# Patient Record
Sex: MALE | Race: WHITE | NOT HISPANIC OR LATINO | Employment: FULL TIME | URBAN - METROPOLITAN AREA
[De-identification: names, ages, dates, MRNs, and addresses within clinical notes are randomized per-mention and may not be internally consistent; named-entity substitution may affect disease eponyms.]

---

## 2017-12-28 ENCOUNTER — GENERIC CONVERSION - ENCOUNTER (OUTPATIENT)
Dept: OTHER | Facility: OTHER | Age: 32
End: 2017-12-28

## 2017-12-28 ENCOUNTER — ALLSCRIPTS OFFICE VISIT (OUTPATIENT)
Dept: OTHER | Facility: OTHER | Age: 32
End: 2017-12-28

## 2018-01-24 VITALS
SYSTOLIC BLOOD PRESSURE: 138 MMHG | BODY MASS INDEX: 41.52 KG/M2 | DIASTOLIC BLOOD PRESSURE: 78 MMHG | WEIGHT: 274 LBS | HEIGHT: 68 IN

## 2018-03-07 NOTE — CONSULTS
Plan    1  Amoxicillin-Pot Clavulanate 875-125 MG Oral Tablet (Augmentin); TAKE 1 TABLET   EVERY 12 HOURS WITH MEALS UNTIL GONE    Assessment    1  Chronic tonsillitis (474 00) (J35 01)    Chief Complaint  Chief Complaint Free Text Note Form: Napoleon Bowden is here for c/o swollen tonsils and tonsil stones      History of Present Illness  HPI: He presents with tonsil problems  He has had swollen tonsils persistently for years  He has some associated difficulty swallowing and has tonsiliths on the left side  He had a URI about 2 weeks ago with an associated sore throat  He gets tonsillitis about once per year and this can last as long as a month  Most recent sore throat took one week to resolve  Denies snoring and apnea  Feels like he gets restful sleep at night without any daytime somnolence  Denies voice changes  Denies hallitosis  Has not taken antibiotics for years  Review of Systems  Complete ENT ROS [facility]:   Eyes: itching of the eyes  Ears: No complaints of hearing loss, discharge, imbalance, recent ear infections, or tinnitus  Nose: discharge or runny nose  Mouth: dental problems  Throat: throat pain, difficulty swallowing and hoarseness  Neck: No neck soreness, no neck pain, no neck lumps or swelling  Genitourinary: No complaints of dysuria, flank pain or frequent urination  Cardiovascular: No complaints of chest pain or palpitations  Respiratory: asthma, shortness of breath and wheezing, but as noted in HPI  Gastrointestinal: No complaints of heartburn, nausea/vomiting, or constipation  Neurological: headache  ROS Reviewed:   ROS reviewed  Active Problems    1  Abdominal cramping (789 00) (R10 9)   2  Diverticulosis, sigmoid (562 10) (K57 30)   3  Encounter for pre-employment health screening examination (V70 5) (Z02 1)   4  Epiploic appendagitis (558 9) (K52 9)   5  Morbid or severe obesity due to excess calories (278 01) (E66 01)   6   Screening for depression (V79 0) (Z13 89)    Past Medical History    1  History of Dysuria (788 1) (R30 0)   2  History of acute sinusitis (V12 69) (Z87 09)   3  History of Otalgia, unspecified laterality (388 70) (H92 09)   4  History of Parotitis (527 2)   5  History of Sore throat (462) (J02 9)  Past Medical History Reviewed: The past medical history was reviewed and updated today  Surgical History  Surgical History Reviewed: The surgical history was reviewed and updated today  Family History  Mother    1  No pertinent family history  Maternal Grandmother    2  Family history of Coronary Artery Disease (V17 49)  Family History Reviewed: The family history was reviewed and updated today  Social History    · Never A Smoker  Social History Reviewed: The social history was reviewed and updated today  Current Meds   1  No Reported Medications Recorded    Allergies    1  No Known Drug Allergies    Vitals  Signs   Recorded: [id number] 24:87EZ   Systolic: 857, RUE, Sitting  Diastolic: 78, RUE, Sitting  Height: 5 ft 8 in  Weight: 274 lb   BMI Calculated: 41 66  BSA Calculated: 2 34    Physical Exam  Constitutional:  Well developed, well nourished and groomed, in no acute distress  Eyes:  Extra-ocular movements intact, pupils equally round and reactive to light and accommodation, the lids and conjunctivae are normal in appearance  HEENT:    Head: Atraumatic, normocephalic, no visible scalp lesions, bony palpation unremarkable without stepoffs, parotid and submandibular salivary glands non-tender to palpation and without masses bilaterally  Ears:  Auricles normal in appearance bilaterally, mastoid prominence non-tender, external auditory canals clear bilaterally, tympanic membranes intact bilaterally without evidence of middle ear effusion or masses, normal appearing ossicles       Nose/Sinuses:  External appearance unremarkable, no maxillary or frontal sinus tenderness to palpation bilaterally, anterior rhinoscopy reveals normal appearing mucosa, without polyps or masses  Oral Cavity:  Moist mucus membranes, gums dentition unremarkable, no oral mucosal masses or lesions, floor of mouth soft, tongue mobile without masses or lesions  Oropharynx:  Base of tongue soft and without masses, tonsils bilaterally 2-3+, soft palate mucosa unremarkable, laryngeal mirror exam unrevealing  Malampati grade 1 view  Neck:  No visible or palpable cervical lesions or lymphadenopathy, thyroid gland is normal in size and symmetry and without masses, normal laryngeal elevation with swallowing  Cardiovascular:  Normal rate and rhythm, no palpable thrills, no jugulovenous distension observed  Respiratory:  Normal respiratory effort without evidence of retractions or use of accessory muscles  Integument:  Normal appearing without observed masses or lesions  Neurologic:  Cranial nerves II-XII intact bilaterally  Psychiatric:  Alert and oriented to time, place and person, normal affect  Discussion/Summary  Discussion Summary:   He has moderate tonsillar enlargement and a deep crypt on the left  This is likely the source of his tonsil stones  I recommended a 2 week course of antibiotics and stated that this may help reduce the size of the tonsils but this may take a period of weeks  We talked about the risk of diarrhea on antibiotics  Alternatively for tonsil stones she can use a water pick to help disimpact stones  This will help avoid triggering his gag reflex  Finally, the ultimate solution to this problem as a tonsillectomy  we discussed the details of the surgery but he is not ready to commit to it at this time  He will follow up again if he desires surgery        Signatures   Electronically signed by : ADRIANA Proctor ; Dec 28 2017  9:56AM EST                       (Author)

## 2018-06-25 ENCOUNTER — OFFICE VISIT (OUTPATIENT)
Dept: FAMILY MEDICINE CLINIC | Facility: CLINIC | Age: 33
End: 2018-06-25
Payer: COMMERCIAL

## 2018-06-25 ENCOUNTER — TRANSCRIBE ORDERS (OUTPATIENT)
Dept: RADIOLOGY | Facility: CLINIC | Age: 33
End: 2018-06-25

## 2018-06-25 ENCOUNTER — APPOINTMENT (OUTPATIENT)
Dept: RADIOLOGY | Facility: CLINIC | Age: 33
End: 2018-06-25
Payer: COMMERCIAL

## 2018-06-25 ENCOUNTER — TELEPHONE (OUTPATIENT)
Dept: FAMILY MEDICINE CLINIC | Facility: CLINIC | Age: 33
End: 2018-06-25

## 2018-06-25 VITALS
DIASTOLIC BLOOD PRESSURE: 72 MMHG | TEMPERATURE: 97.2 F | HEIGHT: 67 IN | WEIGHT: 268.2 LBS | HEART RATE: 88 BPM | RESPIRATION RATE: 16 BRPM | SYSTOLIC BLOOD PRESSURE: 136 MMHG | BODY MASS INDEX: 42.09 KG/M2

## 2018-06-25 DIAGNOSIS — M79.672 LEFT FOOT PAIN: ICD-10-CM

## 2018-06-25 DIAGNOSIS — S93.402A SPRAIN OF LEFT ANKLE, UNSPECIFIED LIGAMENT, INITIAL ENCOUNTER: Primary | ICD-10-CM

## 2018-06-25 DIAGNOSIS — S93.402A SPRAIN OF LEFT ANKLE, UNSPECIFIED LIGAMENT, INITIAL ENCOUNTER: ICD-10-CM

## 2018-06-25 PROCEDURE — 73610 X-RAY EXAM OF ANKLE: CPT

## 2018-06-25 PROCEDURE — 73630 X-RAY EXAM OF FOOT: CPT

## 2018-06-25 PROCEDURE — 3008F BODY MASS INDEX DOCD: CPT | Performed by: NURSE PRACTITIONER

## 2018-06-25 PROCEDURE — 99214 OFFICE O/P EST MOD 30 MIN: CPT | Performed by: NURSE PRACTITIONER

## 2018-06-25 RX ORDER — NAPROXEN 500 MG/1
500 TABLET ORAL 2 TIMES DAILY WITH MEALS
Qty: 30 TABLET | Refills: 1 | Status: SHIPPED | OUTPATIENT
Start: 2018-06-25 | End: 2018-09-25 | Stop reason: HOSPADM

## 2018-06-25 NOTE — TELEPHONE ENCOUNTER
----- Message from Derick Martini, [address] sent at 6/25/2018 12:54 PM EDT -----  Please call pt and advise that xrays were normal, no fracture

## 2018-06-25 NOTE — LETTER
June 25, 2018     Patient: Brandi Florez   YOB: 1985   Date of Visit: 6/25/2018       To Whom it May Concern:    Conception Gautam is under my professional care  He was seen in my office on 6/25/2018  He may return to work on 6/26/18  No lifting greater than 10 lbs for one week  Limited walking , maximum of 500ft at a time  No climbing  Restrictions in place for one week  May return to work full duty 7/2/18  If you have any questions or concerns, please don't hesitate to call           Sincerely,          PETERSON Machuca        CC: No Recipients

## 2018-06-25 NOTE — PATIENT INSTRUCTIONS
Xrays as ordered  Naprosyn 500mg twice daily with food for one week and then as needed  Ace wrap  Ice  Elevation

## 2018-06-25 NOTE — PROGRESS NOTES
Assessment/Plan:  1  Sprain of left ankle, unspecified ligament, initial encounter  Ice, ace wrap, elevation  - XR ankle 3+ vw left; Future  - naproxen (EC NAPROSYN) 500 MG EC tablet; Take 1 tablet (500 mg total) by mouth 2 (two) times a day with meals  Dispense: 30 tablet; Refill: 1  2  Left foot pain  - XR foot 3+ vw right; Future        Subjective:      Patient ID: Kevin West is a 29 y o  male who presents for ankle pain    Pain left  ankle and top of foot    Started 5 days ago  No specific injury  Hurts to bear weight  [place]  Unsure if he was hold foot in awkard position while driving  No excessive walking  Did remember jumping off tailgate of truck   No pain at that time and does not remember having any signficant pain until later  Works as  and does physical work, walking, climbing, lifting  No numbness to foot or ankle  Minimal swelling  No discoloration  Did not take any otc meds  The following portions of the patient's history were reviewed and updated as appropriate: allergies, current medications, past family history, past medical history, past social history, past surgical history and problem list     Review of Systems   Cardiovascular: Negative for leg swelling  Musculoskeletal: Positive for arthralgias  Pain left foot and ankle  Hurts to bear weight  Hurts to bend foot  Stiff this am, but a little better now  Skin: Negative for color change and wound  Neurological: Negative for numbness  Objective:      /72 (BP Location: Left arm, Patient Position: Sitting, Cuff Size: Standard)   Pulse 88   Temp (!) 97 2 °F (36 2 °C)   Resp 16   Ht 5' 7" (1 702 m)   Wt 122 kg (268 lb 3 2 oz)   BMI 42 01 kg/m²          Physical Exam   Constitutional: He is oriented to person, place, and time  He appears well-developed and well-nourished  No distress  Musculoskeletal: Normal range of motion  He exhibits tenderness   He exhibits no edema or deformity  Full left ankle and foot rom but pain with dorsiflexion and extension  No bony tenderness    (+) pain with palpation anterior ankle  Neurological: He is alert and oriented to person, place, and time  Skin: Skin is warm and dry  No rash noted  No erythema  No pallor  Psychiatric: He has a normal mood and affect  His behavior is normal  Judgment and thought content normal    Vitals reviewed

## 2018-09-25 ENCOUNTER — OFFICE VISIT (OUTPATIENT)
Dept: FAMILY MEDICINE CLINIC | Facility: CLINIC | Age: 33
End: 2018-09-25
Payer: COMMERCIAL

## 2018-09-25 VITALS
BODY MASS INDEX: 43.07 KG/M2 | SYSTOLIC BLOOD PRESSURE: 118 MMHG | DIASTOLIC BLOOD PRESSURE: 70 MMHG | TEMPERATURE: 97.5 F | HEART RATE: 72 BPM | WEIGHT: 275 LBS | RESPIRATION RATE: 16 BRPM

## 2018-09-25 DIAGNOSIS — H81.13 BENIGN PAROXYSMAL POSITIONAL VERTIGO DUE TO BILATERAL VESTIBULAR DISORDER: Primary | ICD-10-CM

## 2018-09-25 PROCEDURE — 99214 OFFICE O/P EST MOD 30 MIN: CPT | Performed by: NURSE PRACTITIONER

## 2018-09-25 NOTE — PATIENT INSTRUCTIONS
Benign Paroxysmal Positional Vertigo   AMBULATORY CARE:   Benign paroxysmal positional vertigo (BPPV)  is an inner ear condition that causes you to suddenly feel dizzy  Benign means it is not serious or life-threatening  BPPV is caused by a problem with the nerves and structure of your inner ear  BPPV happens when small pieces of calcium break loose and lump together in one of your inner ear canals  Common symptoms include the following: You may feel that you or the room is moving or spinning  Turning your head, rolling over in bed, getting up or lying down may lead to sudden vertigo  You may also have any of the following symptoms:  · Nystagmus (quick shaky eye movement that you cannot control)    · Nausea    · Poor balance and feeling unsteady when you walk  Seek care immediately if:   · You fall during a BPPV episode and are injured  · You have a severe headache that does not go away  · You have new changes in your vision or feel weak or confused  · You have problems hearing, or you have ringing or buzzing in your ears  Contact your healthcare provider if:   · Your BPPV symptoms do not go away or they return  · You have problems with your balance, or you are falling often  · You have new or increased nausea or vomiting with vertigo  · You feel anxious or depressed and do not want to leave your home  · You have questions or concerns about your condition or care  Management of BPPV:   · Your healthcare provider will teach you how to move your head and body to prevent symptoms  For example, he or she may teach you certain ways to move your head or body  These movements usually help relieve your symptoms and keep the dizziness from returning  The exercises help move the calcium pieces to a different part of your ear  Do the movements only as directed  · Vestibular and balance rehabilitation therapy (VBRT)  is used to teach you exercises to improve your balance and strength   VBRT may help decrease your dizziness and prevent injuries if you are at risk for falls  · Medicines  may be recommended or prescribed to treat dizziness or nausea  Prevent your symptoms:   · Try to avoid sudden head movements  Stand up and lie down slowly  · Raise and support your head when you lie down  Place pillows under your upper back and head or rest in a recliner  · Change your position often when you are lying down  Try not to lie with your head on the same side for long periods of time  Roll over slowly  · Wear protective gear  when you ride a bike or play sports  A helmet helps protect your head from injury  Follow up with your healthcare provider as directed: You may need to return in 1 month to check the progress of your treatment  Write down your questions so you remember to ask them during your visits  © 2017 [employer] Information is for End User's use only and may not be sold, redistributed or otherwise used for commercial purposes  All illustrations and images included in CareNotes® are the copyrighted property of A D [employer] M [employer]  or Saul Feliciano  The above information is an  only  It is not intended as medical advice for individual conditions or treatments  Talk to your doctor, nurse or pharmacist before following any medical regimen to see if it is safe and effective for you

## 2018-09-25 NOTE — PROGRESS NOTES
Assessment/Plan:  1  Benign paroxysmal positional vertigo due to bilateral vestibular disorder  Improving  Monitor  rto if symptoms persist or worsen  Offered rx for antivert, but pt will take otc motion sickness meds as needed  Suspect viral etiology       Subjective:      Patient ID: Amanda Lockhart is a 29 y o  male who presents for dizziness    Presents for dizziness  Intermittent now  Started on Friday, 4 days ago  Worst on first day  Did have nausea initially  No vomiting  No recent illness  No cold symptoms  No GI symptoms except the initial nausea    "swaying" feeling  Improving  Took a motion pill, otc  The following portions of the patient's history were reviewed and updated as appropriate: allergies, current medications, past family history, past medical history, past social history, past surgical history and problem list     Review of Systems   Constitutional: Negative for fever  HENT: Negative for congestion, ear pain, sinus pain and sinus pressure  Respiratory: Negative for chest tightness and shortness of breath  Cardiovascular: Negative for chest pain and palpitations  Gastrointestinal: Positive for nausea  Negative for diarrhea and vomiting  Endocrine: Negative for polydipsia, polyphagia and polyuria  Skin: Negative for rash  Neurological: Positive for dizziness and light-headedness  Negative for syncope and headaches  Hematological: Negative for adenopathy  Objective:      /70 (BP Location: Right arm)   Pulse 72   Temp 97 5 °F (36 4 °C)   Resp 16   Wt 125 kg (275 lb)   BMI 43 07 kg/m²          Physical Exam   Constitutional: He is oriented to person, place, and time  He appears well-developed and well-nourished  No distress  HENT:   Right tm mildly erythematous  No bulging  Left tm wnl  Turbinates wnl   No sinus tenderness on palpation  Oropharynx with no erythema or exudate      Eyes: EOM are normal    Neck:   No audible carotid bruit Cardiovascular: Normal rate, regular rhythm and normal heart sounds  No murmur heard  Pulmonary/Chest: Effort normal and breath sounds normal  No respiratory distress  He has no wheezes  Neurological: He is alert and oriented to person, place, and time  No focality   Skin: Skin is warm and dry  No rash noted  No erythema  Psychiatric: He has a normal mood and affect  His behavior is normal  Judgment and thought content normal    Vitals reviewed

## 2018-10-09 ENCOUNTER — OFFICE VISIT (OUTPATIENT)
Dept: FAMILY MEDICINE CLINIC | Facility: CLINIC | Age: 33
End: 2018-10-09
Payer: COMMERCIAL

## 2018-10-09 VITALS
SYSTOLIC BLOOD PRESSURE: 134 MMHG | TEMPERATURE: 97.5 F | RESPIRATION RATE: 16 BRPM | DIASTOLIC BLOOD PRESSURE: 88 MMHG | BODY MASS INDEX: 41.37 KG/M2 | HEART RATE: 68 BPM | WEIGHT: 273 LBS | HEIGHT: 68 IN

## 2018-10-09 DIAGNOSIS — Z00.00 ANNUAL PHYSICAL EXAM: Primary | ICD-10-CM

## 2018-10-09 DIAGNOSIS — E66.01 MORBID OBESITY WITH BMI OF 40.0-44.9, ADULT (HCC): ICD-10-CM

## 2018-10-09 DIAGNOSIS — Z23 NEED FOR INFLUENZA VACCINATION: ICD-10-CM

## 2018-10-09 PROCEDURE — 99395 PREV VISIT EST AGE 18-39: CPT | Performed by: NURSE PRACTITIONER

## 2018-10-09 NOTE — PATIENT INSTRUCTIONS

## 2018-10-09 NOTE — PROGRESS NOTES
FAMILY PRACTICE HEALTH MAINTENANCE OFFICE VISIT  [facility]    NAME: Gayle Sommer  AGE: 29 y o  SEX: male  : 1985     DATE: 10/9/2018    Assessment and Plan     1  Annual physical exam  Health maintenance discussed  Diet, exercise, weight management, stress management, etc    Healthy diet educational information given  All questions addressed, answered, and pt verbalized understanding  Anticipatory guidance  - CBC and differential; Future  - Comprehensive metabolic panel; Future  - Lipid panel; Future  - TSH, 3rd generation; Future  2  Morbid obesity with BMI of 40 0-44 9, adult (HCC)  Healthy diet, reviewed and information given  Advised to try to decrease empty calories such as soda  Limit carbs  Increase exercise  Weight loss goal of 10% of body weight or 30 lbs over next year encouraged  Anticipatory guidance  - CBC and differential; Future  - Comprehensive metabolic panel; Future  - Lipid panel; Future  - TSH, 3rd generation; Future  -     · Patient Counseling:   · Nutrition: Stressed importance of a well balanced diet, moderation of sodium/saturated fat, caloric balance and sufficient intake of fiber  · Exercise: Stressed the importance of regular exercise with a goal of 150 minutes per week  · Dental Health: Discussed daily flossing and brushing and regular dental visits   · Alcohol Use:  Recommended moderation of alcohol intake    · Immunizations reviewed: refused flu vaccine and tdap  Counseled and information given  · Discussed benefits of screening: reviewed  · Discussed the patient's BMI with him  The BMI 41 is above average; BMI management plan is completed           Chief Complaint     Chief Complaint   Patient presents with    Physical Exam     Pt here today for a CPE  Pt declines the flu vaccine  History of Present Illness     Here for physical   No recent labs  No recent illness or issues  Works as a    Works second shift and "doesnt have much time to exercise"  Well Adult Physical   Patient here for a comprehensive physical exam       Diet and Physical Activity  Diet: poor diet  Weight concerns: Patient has class 3 obesity (BMI >40)  Exercise: never ; counseled and discussed options for exercise, risks and benefits     Depression Screen  PHQ-9 Depression Screening    PHQ-9:    Frequency of the following problems over the past two weeks:               General Health  Hearing: Normal:  bilateral  Vision: no vision problems  Dental: no dental visits for >1 year and brushes teeth once daily            The following portions of the patient's history were reviewed and updated as appropriate: allergies, current medications, past family history, past medical history, past social history, past surgical history and problem list     Review of Systems     Review of Systems   Constitutional: Negative for chills, diaphoresis, fatigue and fever  HENT: Negative for congestion, sinus pain, sinus pressure and sore throat  Eyes: Negative for visual disturbance  Respiratory: Negative for cough, chest tightness, shortness of breath and wheezing  Cardiovascular: Negative for chest pain, palpitations and leg swelling  Gastrointestinal: Negative for abdominal distention, abdominal pain, diarrhea and nausea  Genitourinary: Negative for dysuria, frequency and urgency  Musculoskeletal: Negative for arthralgias, back pain and neck pain  Skin: Negative for rash  Neurological: Negative for dizziness, weakness and headaches  Hematological: Negative for adenopathy  Psychiatric/Behavioral: Negative for confusion  The patient is not nervous/anxious          Past Medical History     Denies any significant past medical history    Past Surgical History     Denies past surgeries    Social History     Social History     Social History    Marital status: Single     Spouse name: N/A    Number of children: N/A    Years of education: N/A     Social History Main Topics    Smoking status: Never Smoker    Smokeless tobacco: Never Used    Alcohol use Yes      Comment: rare    Drug use: No    Sexual activity: No     Other Topics Concern    None     Social History Narrative    None       Family History     Family History   Problem Relation Age of Onset    No Known Problems Mother     No Known Problems Father     Coronary artery disease Maternal Grandmother        Current Medications     No current outpatient prescriptions on file  Allergies     No Known Allergies    Objective     /88 (BP Location: Right arm)   Pulse 68   Temp 97 5 °F (36 4 °C) (Temporal)   Resp 16   Ht 5' 8" (1 727 m)   Wt 124 kg (273 lb)   BMI 41 51 kg/m²      Physical Exam   Constitutional: He is oriented to person, place, and time  He appears well-developed and well-nourished  No distress  HENT:   Head: Normocephalic and atraumatic  Mouth/Throat: Oropharynx is clear and moist    Eyes: Pupils are equal, round, and reactive to light  EOM are normal    Neck: Normal range of motion  Neck supple  No JVD present  No thyromegaly present  No audible carotid bruit   Cardiovascular: Normal rate and regular rhythm  No murmur heard  Pulmonary/Chest: Effort normal and breath sounds normal  No respiratory distress  He has no wheezes  He has no rales  Abdominal: Soft  Bowel sounds are normal  He exhibits no distension  There is no tenderness  Musculoskeletal: Normal range of motion  He exhibits no edema  Lymphadenopathy:     He has no cervical adenopathy  Neurological: He is alert and oriented to person, place, and time  Skin: Skin is warm and dry  No rash noted  No erythema  Psychiatric: He has a normal mood and affect   His behavior is normal  Judgment and thought content normal           Visual Acuity Screening    Right eye Left eye Both eyes   Without correction: 20/15 20/13 20/13   With correction:          Health Maintenance Health Maintenance   Topic Date Due    DTaP,Tdap,and Td Vaccines (7 - Td) 06/15/2016    INFLUENZA VACCINE  09/01/2018    Depression Screening PHQ  06/25/2019     Immunization History   Administered Date(s) Administered    DTP 05/25/1988, 07/27/1988, 09/28/1988, 10/10/1990, 06/09/1993    Hep B, adult 08/28/2002, 09/30/2002, 02/28/2003    Hib (PRP-OMP) 09/13/1989    MMR 09/13/1989, 06/04/1999    Meningococcal, Unknown Serogroups 06/15/2006    OPV 02/25/1988, 07/27/1988, 10/10/1990, 06/09/1993    Td (adult), adsorbed 06/14/1999    Tdap 06/15/2006       Yocasta Terrazas, [address] [facility]

## 2018-10-12 LAB
ALBUMIN SERPL-MCNC: 4.6 G/DL (ref 3.5–5.5)
ALBUMIN/GLOB SERPL: 1.8 {RATIO} (ref 1.2–2.2)
ALP SERPL-CCNC: 92 IU/L (ref 39–117)
ALT SERPL-CCNC: 92 IU/L (ref 0–44)
AST SERPL-CCNC: 43 IU/L (ref 0–40)
BASOPHILS # BLD AUTO: 0 X10E3/UL (ref 0–0.2)
BASOPHILS NFR BLD AUTO: 1 %
BILIRUB SERPL-MCNC: 0.6 MG/DL (ref 0–1.2)
BUN SERPL-MCNC: 11 MG/DL (ref 6–20)
BUN/CREAT SERPL: 12 (ref 9–20)
CALCIUM SERPL-MCNC: 9.6 MG/DL (ref 8.7–10.2)
CHLORIDE SERPL-SCNC: 102 MMOL/L (ref 96–106)
CHOLEST SERPL-MCNC: 236 MG/DL (ref 100–199)
CHOLEST/HDLC SERPL: 5.5 RATIO (ref 0–5)
CO2 SERPL-SCNC: 22 MMOL/L (ref 20–29)
CREAT SERPL-MCNC: 0.91 MG/DL (ref 0.76–1.27)
EOSINOPHIL # BLD AUTO: 0.3 X10E3/UL (ref 0–0.4)
EOSINOPHIL NFR BLD AUTO: 5 %
ERYTHROCYTE [DISTWIDTH] IN BLOOD BY AUTOMATED COUNT: 14.3 % (ref 12.3–15.4)
GLOBULIN SER-MCNC: 2.6 G/DL (ref 1.5–4.5)
GLUCOSE SERPL-MCNC: 82 MG/DL (ref 65–99)
HCT VFR BLD AUTO: 43 % (ref 37.5–51)
HDLC SERPL-MCNC: 43 MG/DL
HGB BLD-MCNC: 15.3 G/DL (ref 13–17.7)
IMM GRANULOCYTES # BLD: 0 X10E3/UL (ref 0–0.1)
IMM GRANULOCYTES NFR BLD: 0 %
LDLC SERPL CALC-MCNC: 151 MG/DL (ref 0–99)
LYMPHOCYTES # BLD AUTO: 2.3 X10E3/UL (ref 0.7–3.1)
LYMPHOCYTES NFR BLD AUTO: 38 %
MCH RBC QN AUTO: 30.2 PG (ref 26.6–33)
MCHC RBC AUTO-ENTMCNC: 35.6 G/DL (ref 31.5–35.7)
MCV RBC AUTO: 85 FL (ref 79–97)
MONOCYTES # BLD AUTO: 0.5 X10E3/UL (ref 0.1–0.9)
MONOCYTES NFR BLD AUTO: 9 %
NEUTROPHILS # BLD AUTO: 2.9 X10E3/UL (ref 1.4–7)
NEUTROPHILS NFR BLD AUTO: 47 %
PLATELET # BLD AUTO: 239 X10E3/UL (ref 150–379)
POTASSIUM SERPL-SCNC: 4.1 MMOL/L (ref 3.5–5.2)
PROT SERPL-MCNC: 7.2 G/DL (ref 6–8.5)
RBC # BLD AUTO: 5.06 X10E6/UL (ref 4.14–5.8)
SL AMB EGFR AFRICAN AMERICAN: 130 ML/MIN/1.73
SL AMB EGFR NON AFRICAN AMERICAN: 113 ML/MIN/1.73
SL AMB VLDL CHOLESTEROL CALC: 42 MG/DL (ref 5–40)
SODIUM SERPL-SCNC: 139 MMOL/L (ref 134–144)
TRIGL SERPL-MCNC: 210 MG/DL (ref 0–149)
TSH SERPL DL<=0.005 MIU/L-ACNC: 1.53 UIU/ML (ref 0.45–4.5)
WBC # BLD AUTO: 6.1 X10E3/UL (ref 3.4–10.8)

## 2019-10-04 ENCOUNTER — TELEPHONE (OUTPATIENT)
Dept: FAMILY MEDICINE CLINIC | Facility: CLINIC | Age: 34
End: 2019-10-04

## 2020-05-05 ENCOUNTER — TELEPHONE (OUTPATIENT)
Dept: FAMILY MEDICINE CLINIC | Facility: CLINIC | Age: 35
End: 2020-05-05

## 2020-05-08 ENCOUNTER — TELEMEDICINE (OUTPATIENT)
Dept: FAMILY MEDICINE CLINIC | Facility: CLINIC | Age: 35
End: 2020-05-08
Payer: COMMERCIAL

## 2020-05-08 VITALS
WEIGHT: 285 LBS | HEART RATE: 100 BPM | DIASTOLIC BLOOD PRESSURE: 79 MMHG | HEIGHT: 68 IN | BODY MASS INDEX: 43.19 KG/M2 | SYSTOLIC BLOOD PRESSURE: 134 MMHG

## 2020-05-08 DIAGNOSIS — R42 VERTIGO: Primary | ICD-10-CM

## 2020-05-08 PROBLEM — H81.13 BENIGN PAROXYSMAL POSITIONAL VERTIGO DUE TO BILATERAL VESTIBULAR DISORDER: Status: ACTIVE | Noted: 2020-05-08

## 2020-05-08 PROCEDURE — 99213 OFFICE O/P EST LOW 20 MIN: CPT | Performed by: NURSE PRACTITIONER

## 2020-06-12 ENCOUNTER — APPOINTMENT (OUTPATIENT)
Dept: RADIOLOGY | Facility: CLINIC | Age: 35
End: 2020-06-12
Payer: OTHER MISCELLANEOUS

## 2020-06-12 DIAGNOSIS — S49.91XA INJURY OF RIGHT SHOULDER, INITIAL ENCOUNTER: ICD-10-CM

## 2020-06-12 PROCEDURE — 73030 X-RAY EXAM OF SHOULDER: CPT

## 2020-07-21 ENCOUNTER — OFFICE VISIT (OUTPATIENT)
Dept: FAMILY MEDICINE CLINIC | Facility: CLINIC | Age: 35
End: 2020-07-21
Payer: COMMERCIAL

## 2020-07-21 VITALS
DIASTOLIC BLOOD PRESSURE: 70 MMHG | OXYGEN SATURATION: 98 % | HEIGHT: 68 IN | BODY MASS INDEX: 44.25 KG/M2 | WEIGHT: 292 LBS | RESPIRATION RATE: 12 BRPM | HEART RATE: 80 BPM | SYSTOLIC BLOOD PRESSURE: 134 MMHG | TEMPERATURE: 98.4 F

## 2020-07-21 DIAGNOSIS — R06.02 SOB (SHORTNESS OF BREATH): ICD-10-CM

## 2020-07-21 DIAGNOSIS — E66.01 MORBID OBESITY WITH BMI OF 40.0-44.9, ADULT (HCC): ICD-10-CM

## 2020-07-21 DIAGNOSIS — J45.20 MILD INTERMITTENT ASTHMA WITHOUT COMPLICATION: Primary | ICD-10-CM

## 2020-07-21 PROCEDURE — 1036F TOBACCO NON-USER: CPT | Performed by: NURSE PRACTITIONER

## 2020-07-21 PROCEDURE — 3008F BODY MASS INDEX DOCD: CPT | Performed by: NURSE PRACTITIONER

## 2020-07-21 PROCEDURE — 99214 OFFICE O/P EST MOD 30 MIN: CPT | Performed by: NURSE PRACTITIONER

## 2020-07-21 RX ORDER — ALBUTEROL SULFATE 90 UG/1
2 AEROSOL, METERED RESPIRATORY (INHALATION) EVERY 6 HOURS PRN
Qty: 1 INHALER | Refills: 3 | Status: SHIPPED | OUTPATIENT
Start: 2020-07-21

## 2020-07-21 NOTE — PROGRESS NOTES
Assessment/Plan:  1  Mild intermittent asthma without complication  Rescue inhaler 2 puffs  every 4-6 hours as needed for shortness breath, chest tightness, bronchospasm, coughing fits  '  - albuterol (PROVENTIL HFA,VENTOLIN HFA) 90 mcg/act inhaler; Inhale 2 puffs every 6 (six) hours as needed for wheezing or shortness of breath  Dispense: 1 Inhaler; Refill: 3  2  Morbid obesity with BMI of 40 0-44 9, adult ([facility])  BMI Counseling: Body mass index is 44 4 kg/m²  The BMI is above normal  Nutrition recommendations include reducing portion sizes, decreasing overall calorie intake, reducing fast food intake, decreasing soda and/or juice intake and moderation in carbohydrate intake  Exercise recommendations include exercising 3-5 times per week  3  SOB (shortness of breath)  Monitor  Rescue inhaler prn  May need to consider further pulmonary eval and/or maintenance meds  Depression Screening Follow-up Plan: Patient's depression screening was negative with a PHQ-2 score of 0  Clinically patient does not have depression  No treatment is required  Subjective:      Patient ID: Lexi Dyer is a 30 y o  male who presents for possible asthma    Here for breathing issues  Sob at times last couple of months  Has tried mother's Xopenex inhaler at times and it has helped  No chest pain but when gets sob , chest feels tight  Intermittent cough  No fever  No cold symptoms  Last week was on vacation in [place]  Increased sob and felt like couldn't get catch breath  Went to ED  Treated with prednisone and inhaler  Was on prednisone for 6 days  Seemed to help  Used inhaler today once  Has multiple environmental allergies but does not take daily meds  Currently, "feels fine"         The following portions of the patient's history were reviewed and updated as appropriate: allergies, current medications, past family history, past medical history, past social history, past surgical history and problem list     Review of Systems   Constitutional: Negative for chills, diaphoresis, fatigue and fever  HENT: Negative for congestion, sinus pressure and sinus pain  Respiratory: Positive for cough, chest tightness, shortness of breath and wheezing  Cardiovascular: Negative for chest pain, palpitations and leg swelling  Gastrointestinal: Negative for abdominal pain, diarrhea, nausea and vomiting  Musculoskeletal: Negative for back pain and myalgias  Skin: Negative for color change and pallor  Neurological: Negative for dizziness, weakness and headaches  Hematological: Negative for adenopathy  Objective:      /70 (BP Location: Right arm, Patient Position: Sitting, Cuff Size: Large)   Pulse 80   Temp 98 4 °F (36 9 °C) (Temporal)   Resp 12   Ht 5' 8" (1 727 m)   Wt 132 kg (292 lb)   SpO2 98%   BMI 44 40 kg/m²          Physical Exam   Constitutional: He is oriented to person, place, and time  He appears well-developed and well-nourished  No distress  HENT:   Head: Normocephalic and atraumatic  TMS WNL  Turbinates WNL  Oropharynx with no erythema or exudate  No sinus tenderness to palpation  (-) PND     Neck: Normal range of motion  Neck supple  No JVD present  Cardiovascular: Normal rate, regular rhythm and normal heart sounds  Pulmonary/Chest: Effort normal and breath sounds normal  No respiratory distress  He has no wheezes  He has no rales  Abdominal: Soft  He exhibits no distension  There is no tenderness  Lymphadenopathy:     He has no cervical adenopathy  Neurological: He is alert and oriented to person, place, and time  No cranial nerve deficit  Coordination normal    Skin: Skin is warm and dry  No rash noted  He is not diaphoretic  Psychiatric: He has a normal mood and affect  His behavior is normal  Judgment and thought content normal    Vitals reviewed

## 2020-07-21 NOTE — PATIENT INSTRUCTIONS
Rescue inhaler 2 puffs  every 4-6 hours as needed for shortness breath, chest tightness, bronchospasm, coughing fits     Monitor breathing if you find that you are using the rescue inhaler frequently, call office to discuss further evaluation/treatment

## 2020-11-11 ENCOUNTER — TELEPHONE (OUTPATIENT)
Dept: FAMILY MEDICINE CLINIC | Facility: CLINIC | Age: 35
End: 2020-11-11

## 2021-01-25 ENCOUNTER — OFFICE VISIT (OUTPATIENT)
Dept: FAMILY MEDICINE CLINIC | Facility: CLINIC | Age: 36
End: 2021-01-25
Payer: COMMERCIAL

## 2021-01-25 VITALS
TEMPERATURE: 98 F | RESPIRATION RATE: 16 BRPM | HEIGHT: 68 IN | BODY MASS INDEX: 44.71 KG/M2 | OXYGEN SATURATION: 97 % | WEIGHT: 295 LBS | HEART RATE: 84 BPM | DIASTOLIC BLOOD PRESSURE: 80 MMHG | SYSTOLIC BLOOD PRESSURE: 126 MMHG

## 2021-01-25 DIAGNOSIS — R09.81 SINUS CONGESTION: ICD-10-CM

## 2021-01-25 DIAGNOSIS — J01.00 ACUTE NON-RECURRENT MAXILLARY SINUSITIS: Primary | ICD-10-CM

## 2021-01-25 DIAGNOSIS — R53.83 FATIGUE, UNSPECIFIED TYPE: ICD-10-CM

## 2021-01-25 DIAGNOSIS — J45.20 MILD INTERMITTENT ASTHMA WITHOUT COMPLICATION: ICD-10-CM

## 2021-01-25 DIAGNOSIS — E66.01 MORBID OBESITY WITH BMI OF 40.0-44.9, ADULT (HCC): ICD-10-CM

## 2021-01-25 PROCEDURE — 99214 OFFICE O/P EST MOD 30 MIN: CPT | Performed by: NURSE PRACTITIONER

## 2021-01-25 PROCEDURE — U0005 INFEC AGEN DETEC AMPLI PROBE: HCPCS | Performed by: NURSE PRACTITIONER

## 2021-01-25 PROCEDURE — 3725F SCREEN DEPRESSION PERFORMED: CPT | Performed by: NURSE PRACTITIONER

## 2021-01-25 PROCEDURE — U0003 INFECTIOUS AGENT DETECTION BY NUCLEIC ACID (DNA OR RNA); SEVERE ACUTE RESPIRATORY SYNDROME CORONAVIRUS 2 (SARS-COV-2) (CORONAVIRUS DISEASE [COVID-19]), AMPLIFIED PROBE TECHNIQUE, MAKING USE OF HIGH THROUGHPUT TECHNOLOGIES AS DESCRIBED BY CMS-2020-01-R: HCPCS | Performed by: NURSE PRACTITIONER

## 2021-01-25 PROCEDURE — 3008F BODY MASS INDEX DOCD: CPT | Performed by: NURSE PRACTITIONER

## 2021-01-25 RX ORDER — FLUTICASONE PROPIONATE 50 MCG
1 SPRAY, SUSPENSION (ML) NASAL DAILY
Qty: 16 G | Refills: 0 | Status: SHIPPED | OUTPATIENT
Start: 2021-01-25 | End: 2021-01-27 | Stop reason: HOSPADM

## 2021-01-25 RX ORDER — AMOXICILLIN AND CLAVULANATE POTASSIUM 875; 125 MG/1; MG/1
1 TABLET, FILM COATED ORAL EVERY 12 HOURS SCHEDULED
Qty: 14 TABLET | Refills: 0 | Status: SHIPPED | OUTPATIENT
Start: 2021-01-25 | End: 2021-02-01

## 2021-01-25 NOTE — PATIENT INSTRUCTIONS
Fluids  Rest  Nasal saline rinses  Symptom management for supportive care such as decongestants, tylenol/motrin as needed for fever or discomfort  Use a cool mist humidifier at bedtime  Flonase as directed  Finish antibiotics as prescribed  Augmentin 875mg twice daily for one week  Warm compresses to facilitate nasal drainage  Refer to [facility] for specific information and answers to many frequently asked questions  You are being tested for Covid19  If Covid-19 test is positive, self isolation for 10 days from onset of symptoms and 24 hours with no symptoms and no fever without use of anti-pyrectic medications (ie: Tylenol, Ibuprofen, Advil, etc ) before discontinuing self isolation and/or return to work or normal activities  If you test positive, even with no symptoms, you need to isolate for a full 10 days after positive test    While in self isolation you should be using a private bathroom, sleeping area, mask in the home if any other people are present, no shared eating utensils, etc    Deep breathing exercises to expand lung fields  Monitor temperature daily or if you feel you have a fever  Tylenol or Advil/Ibuprofen is appropriate for fever control  Check pulse oximetry,  if able,  daily and if you are having any increased shortness of breath or chest tightness  Call the office if you are having any increased respiratory symptoms or if pulse oximetry is persistently less than 92%  Current recommendations for treatment include daily multivitamin, vitamin C, zinc, and vitamin D supplementation     Social distancing is imperative  The use of a mask in public places is recommended going forward       CDC emergency warning signs for when to seek medical attention immediately:  - difficulty breathing or shortness of breath  - persistent pain or pressure in the chest   - new confusion or inability to arouse  - bluish lips or face   If you should need to seek medical care, you should notify the ED or EMS that you are under investigation and/or positive for COVID-19 prior and wear a facemask if possible, scarf or bandana

## 2021-01-25 NOTE — PROGRESS NOTES
Assessment/Plan:  1  Acute non-recurrent maxillary sinusitis  Fluids  Rest  Nasal saline rinses  Symptom management for supportive care such as decongestants, tylenol/motrin as needed for fever or discomfort  Use a cool mist humidifier at bedtime  Flonase as directed  Finish antibiotics as prescribed  Warm compresses to facilitate nasal drainage    - amoxicillin-clavulanate (Augmentin) 875-125 mg per tablet; Take 1 tablet by mouth every 12 (twelve) hours for 7 days  Dispense: 14 tablet; Refill: 0  - fluticasone (FLONASE) 50 mcg/act nasal spray; 1 spray into each nostril daily  Dispense: 16 g; Refill: 0  - Novel Coronavirus (Covid-19),PCR [facility] - Collected in Office  2  Fatigue, unspecified type  - Novel Coronavirus (Covid-19),PCR [facility] - Collected in Office  3  Sinus congestion  - Novel Coronavirus (Covid-19),PCR [facility] - Collected in Office  4  Morbid obesity with BMI of 40 0-44 9, adult ([facility] )  Weight loss is recommended to improve overall health  Dietary changes- limit carbohydrates, decrease overall caloric intake, reduce portion sizes, healthier snack choices, limit saturated fats, increase intake of fruits and vegetables, limit junk food  Increase exercise to 3-5 times per week  Consider adding strength exercises to exercise routine  5  Mild intermittent asthma without complication  Stable  Rescue inhaler 2 puffs  every 4-6 hours as needed for shortness breath, chest tightness, bronchospasm, coughing fits  Patient was counseled regarding instructions for management which included: impression/diagnosis, risk/benefits of treatment options, importance of compliance with treatment, risk factor reduction, and prognosis  I have reviewed the instructions with the patient answering all questions and patient verbalized understanding  BMI Counseling: Body mass index is 44 85 kg/m²   The BMI is above normal  Nutrition recommendations include reducing portion sizes, decreasing overall calorie intake, consuming healthier snacks, moderation in carbohydrate intake, reducing intake of saturated fat and trans fat and reducing intake of cholesterol  Exercise recommendations include exercising 3-5 times per week  Depression Screening Follow-up Plan: Patient's depression screening was negative with a PHQ-2 score of 0    Clinically patient does not have depression  No treatment is required  Subjective:      Patient ID: Imelda Duran is a 37 y o  male who presents with sinus symptoms    Here for sinus pain/pressure  Symptoms for about one week  Worse last 4 days  Headache, pressure face, dizziness, nasal congestion  No fever  No sore throat  No sob  No cough  Has not needed to use inhaler  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  Both sister and her boyfriend were positive a couple of weeks ago, was possible exposed then but not sure  The following portions of the patient's history were reviewed and updated as appropriate: allergies, current medications, past family history, past medical history, past social history, past surgical history and problem list     Review of Systems   Constitutional: Positive for fatigue  Negative for fever  HENT: Positive for congestion, postnasal drip, sinus pressure and sinus pain  Negative for sore throat  Respiratory: Negative for cough and shortness of breath  Gastrointestinal: Negative for abdominal pain, diarrhea, nausea and vomiting  Musculoskeletal: Negative for arthralgias and myalgias  Allergic/Immunologic: Negative for immunocompromised state  Neurological: Positive for dizziness and headaches  Hematological: Negative for adenopathy  Objective:      /80 (BP Location: Right arm, Patient Position: Sitting, Cuff Size: Large)   Pulse 84   Temp 98 °F (36 7 °C) (Temporal)   Resp 16   Ht 5' 8" (1 727 m)   Wt 134 kg (295 lb)   SpO2 97%   BMI 44 85 kg/m²          Physical Exam  Vitals signs reviewed     Constitutional: General: He is not in acute distress  Appearance: He is obese  He is not ill-appearing  HENT:      Head: Normocephalic  Right Ear: Tympanic membrane and ear canal normal       Left Ear: Tympanic membrane and ear canal normal       Nose: Congestion present  Comments: Turbinates inflamed  (+) max sinus pain with palpation     Mouth/Throat:      Mouth: Mucous membranes are moist       Pharynx: Oropharynx is clear  Eyes:      General:         Right eye: No discharge  Left eye: No discharge  Cardiovascular:      Rate and Rhythm: Normal rate and regular rhythm  Pulmonary:      Effort: Pulmonary effort is normal  No respiratory distress  Breath sounds: Normal breath sounds  No wheezing or rales  Skin:     General: Skin is warm and dry  Coloration: Skin is not jaundiced or pale  Neurological:      General: No focal deficit present  Mental Status: He is alert and oriented to person, place, and time  Psychiatric:         Mood and Affect: Mood normal          Behavior: Behavior normal          Thought Content:  Thought content normal          Judgment: Judgment normal

## 2021-01-25 NOTE — LETTER
January 25, 2021    Patient:  Stefan Gonzales  YOB: 1985  Date of Last Encounter: 11/11/2020    To whom it may concern:    Stefan Gonzales has been tested  for COVID-19 (Coronavirus) and results are pending  He may return to work on once results have been reviewed and are negative  Testing takes about 3 days on the average for results to be available  The current CDC recommendations are that anyone who is under suspicion of Covid-19 must quarantine until negative results are obtained and/or isolation for 10 days for positive results        Sincerely,        PETERSON Renner

## 2021-01-26 LAB — SARS-COV-2 RNA RESP QL NAA+PROBE: NEGATIVE

## 2021-01-27 ENCOUNTER — TELEMEDICINE (OUTPATIENT)
Dept: FAMILY MEDICINE CLINIC | Facility: CLINIC | Age: 36
End: 2021-01-27
Payer: COMMERCIAL

## 2021-01-27 DIAGNOSIS — J01.00 ACUTE NON-RECURRENT MAXILLARY SINUSITIS: Primary | ICD-10-CM

## 2021-01-27 PROCEDURE — 99213 OFFICE O/P EST LOW 20 MIN: CPT | Performed by: NURSE PRACTITIONER

## 2021-01-27 PROCEDURE — 1036F TOBACCO NON-USER: CPT | Performed by: NURSE PRACTITIONER

## 2021-01-27 NOTE — PROGRESS NOTES
Virtual Regular Visit      Assessment/Plan:  1  Acute non-recurrent maxillary sinusitis  Fluids  Rest  Nasal saline rinses  Symptom management for supportive care such as decongestants, tylenol/motrin as needed for fever or discomfort  Use a cool mist humidifier at bedtime  Flonase as directed  Finish antibiotics as prescribed  Warm compresses to facilitate nasal drainage  Covid test was negative  Recent Results (from the past 672 hour(s))   Novel Coronavirus (Covid-19),PCR [facility] - Collected in Office    Collection Time: 01/25/21 10:22 AM    Specimen: Nose; Nares   Result Value Ref Range    SARS-CoV-2 Negative Negative       Problem List Items Addressed This Visit     None      Visit Diagnoses     Acute non-recurrent maxillary sinusitis    -  Primary               Reason for visit is   Chief Complaint   Patient presents with    COVID-19     Covid f/u    Virtual Regular Visit        Encounter provider PETERSON An    Provider located at [place] [address]  [phone] [facility] [id number]      Recent Visits  Date Type Provider Dept   01/25/21 Office Visit Brenton An recent visits within past 7 days and meeting all other requirements     Today's Visits  Date Type Provider Dept   01/27/21 Telemedicine PETERSON An Pg   Showing today's visits and meeting all other requirements     Future Appointments  No visits were found meeting these conditions  Showing future appointments within next 150 days and meeting all other requirements        The patient was identified by name and date of birth  Waqas Suarez was informed that this is a telemedicine visit and that the visit is being conducted through [facility] and patient was informed that this is a secure, HIPAA-compliant platform  He agrees to proceed     My office door was closed  No one else was in the room    He acknowledged consent and understanding of privacy and security of the video platform  The patient has agreed to participate and understands they can discontinue the visit at any time  Patient is aware this is a billable service  Subjective  Anna Bo is a 37 y o  male , follow up on sinus infection   Follow up on sinus infection  Seen in office on 1/25  Diagnosed with sinus infection and started on augmentin  Was tested for covid, results  Negative  Symptoms getting better  Still with headache, intermittent dizziness, and sinus pressure but improving  No fever  Tolerating abx with no side effects  Not as fatigued as he initially was  No past medical history on file  Past Surgical History:   Procedure Laterality Date    NO PAST SURGERIES         Current Outpatient Medications   Medication Sig Dispense Refill    albuterol (PROVENTIL HFA,VENTOLIN HFA) 90 mcg/act inhaler Inhale 2 puffs every 6 (six) hours as needed for wheezing or shortness of breath 1 Inhaler 3    amoxicillin-clavulanate (Augmentin) 875-125 mg per tablet Take 1 tablet by mouth every 12 (twelve) hours for 7 days 14 tablet 0     No current facility-administered medications for this visit  No Known Allergies    Review of Systems   Constitutional: Negative for fever  HENT: Positive for congestion, postnasal drip, sinus pressure and sinus pain  Respiratory: Positive for cough  Negative for shortness of breath and wheezing  Gastrointestinal: Negative for abdominal pain, diarrhea, nausea and vomiting  Musculoskeletal: Negative for myalgias  Skin: Negative for rash  Allergic/Immunologic: Negative for immunocompromised state  Neurological: Positive for dizziness and headaches  Video Exam    There were no vitals filed for this visit  Physical Exam  Constitutional:       General: He is not in acute distress  Appearance: He is not ill-appearing  HENT:      Head: Normocephalic  Nose: Congestion present     Pulmonary: Effort: Pulmonary effort is normal  No respiratory distress  Skin:     Coloration: Skin is not jaundiced or pale  Neurological:      Mental Status: He is alert and oriented to person, place, and time  Psychiatric:         Mood and Affect: Mood normal          Behavior: Behavior normal          Thought Content: Thought content normal          Judgment: Judgment normal           I spent 10 minutes with patient today in which greater than 50% of the time was spent in counseling/coordination of care regarding impressions/dx, symptom management, medications, test results      VIRTUAL VISIT DISCLAIMER    Nissa Cleaninge acknowledges that he has consented to an online visit or consultation  He understands that the online visit is based solely on information provided by him, and that, in the absence of a face-to-face physical evaluation by the physician, the diagnosis he receives is both limited and provisional in terms of accuracy and completeness  This is not intended to replace a full medical face-to-face evaluation by the physician  Nissa Koenig understands and accepts these terms

## 2021-01-27 NOTE — LETTER
January 27, 2021    Patient:  Waqas Suarez  YOB: 1985  Date of Last Encounter: 1/25/2021    To whom it may concern:    Waqas Suarez has tested negative for COVID-19 (Coronavirus)  He may return to work on 1/28/2021      Sincerely,        PETERSON An

## 2021-01-27 NOTE — PATIENT INSTRUCTIONS
Fluids  Rest  Nasal saline rinses  Symptom management for supportive care such as decongestants, tylenol/motrin as needed for fever or discomfort  Use a cool mist humidifier at bedtime  Flonase as directed  Finish antibiotics as prescribed  Warm compresses to facilitate nasal drainage  Covid test was negative

## 2021-12-16 ENCOUNTER — OFFICE VISIT (OUTPATIENT)
Dept: FAMILY MEDICINE CLINIC | Facility: CLINIC | Age: 36
End: 2021-12-16
Payer: COMMERCIAL

## 2021-12-16 VITALS
HEART RATE: 81 BPM | OXYGEN SATURATION: 97 % | WEIGHT: 294 LBS | SYSTOLIC BLOOD PRESSURE: 110 MMHG | HEIGHT: 69 IN | TEMPERATURE: 96.9 F | BODY MASS INDEX: 43.55 KG/M2 | DIASTOLIC BLOOD PRESSURE: 80 MMHG

## 2021-12-16 DIAGNOSIS — R22.0 FACIAL SWELLING: ICD-10-CM

## 2021-12-16 DIAGNOSIS — R68.84 PAIN IN LOWER JAW: Primary | ICD-10-CM

## 2021-12-16 PROCEDURE — 1036F TOBACCO NON-USER: CPT | Performed by: NURSE PRACTITIONER

## 2021-12-16 PROCEDURE — 3008F BODY MASS INDEX DOCD: CPT | Performed by: NURSE PRACTITIONER

## 2021-12-16 PROCEDURE — 3725F SCREEN DEPRESSION PERFORMED: CPT | Performed by: NURSE PRACTITIONER

## 2021-12-16 PROCEDURE — 99214 OFFICE O/P EST MOD 30 MIN: CPT | Performed by: NURSE PRACTITIONER

## 2021-12-16 RX ORDER — NAPROXEN 500 MG/1
500 TABLET ORAL 2 TIMES DAILY WITH MEALS
Qty: 30 TABLET | Refills: 0 | Status: SHIPPED | OUTPATIENT
Start: 2021-12-16

## 2021-12-16 RX ORDER — AMOXICILLIN 875 MG/1
875 TABLET, COATED ORAL 2 TIMES DAILY
Qty: 14 TABLET | Refills: 0 | Status: SHIPPED | OUTPATIENT
Start: 2021-12-16 | End: 2021-12-23

## 2023-01-03 ENCOUNTER — OFFICE VISIT (OUTPATIENT)
Dept: URGENT CARE | Facility: CLINIC | Age: 38
End: 2023-01-03

## 2023-01-03 VITALS
BODY MASS INDEX: 43.71 KG/M2 | TEMPERATURE: 97.3 F | SYSTOLIC BLOOD PRESSURE: 134 MMHG | HEART RATE: 90 BPM | DIASTOLIC BLOOD PRESSURE: 83 MMHG | RESPIRATION RATE: 20 BRPM | WEIGHT: 296 LBS | OXYGEN SATURATION: 95 %

## 2023-01-03 DIAGNOSIS — J06.9 VIRAL UPPER RESPIRATORY TRACT INFECTION: Primary | ICD-10-CM

## 2023-01-03 NOTE — PROGRESS NOTES
[facility]        NAME: Katina Morales is a 30 y o  male  : 1985    MRN: [id number]  DATE: January 3, 2023  TIME: 12:13 PM    Assessment and Plan   Viral upper respiratory tract infection [J06 9]  1  Viral upper respiratory tract infection          Patient Instructions   Viral upper respiratory infection  Recommend flonase nasal spray and sudafed for postnasal drip/cough  Warm salt water gargles  Rest, fluids and supportive care  May benefit from a cool mist humidifier on night stand  Tylenol/ibuprofen as needed for pain/fever    Follow up with PCP in 3-5 days  Proceed to  ER if symptoms worsen  Chief Complaint     Chief Complaint   Patient presents with   • Cough   • Headache   • Fever     X thursday         History of Present Illness       Kalee Salvador is a 44-year-old male who presents to clinic complaining of nasal congestion x5 days  He also notes a productive cough with mucus production  He also notes sinus pain and pressure bilaterally, nausea, and fever and chills at night  He denies any ear pain, sore throat, fatigue, myalgias, vomiting, shortness of breath, recent travel, or exposure to anyone known COVID-positive  Review of Systems   Review of Systems   Constitutional: Negative for chills and fever  HENT: Positive for congestion, sinus pressure and sinus pain  Negative for ear pain and sore throat  Respiratory: Positive for cough and wheezing  Negative for shortness of breath  Gastrointestinal: Positive for nausea  Negative for diarrhea and vomiting  Musculoskeletal: Negative for myalgias  Neurological: Positive for headaches           Current Medications       Current Outpatient Medications:   •  albuterol (PROVENTIL HFA,VENTOLIN HFA) 90 mcg/act inhaler, Inhale 2 puffs every 6 (six) hours as needed for wheezing or shortness of breath, Disp: 1 Inhaler, Rfl: 3  •  naproxen (Naprosyn) 500 mg tablet, Take 1 tablet (500 mg total) by mouth 2 (two) times a day with meals, Disp: 30 tablet, Rfl: 0    Current Allergies     Allergies as of 01/03/2023   • (No Known Allergies)            The following portions of the patient's history were reviewed and updated as appropriate: allergies, current medications, past family history, past medical history, past social history, past surgical history and problem list      History reviewed  No pertinent past medical history  Past Surgical History:   Procedure Laterality Date   • NO PAST SURGERIES         Family History   Problem Relation Age of Onset   • No Known Problems Mother    • No Known Problems Father    • Coronary artery disease Maternal Grandmother    • Substance Abuse Neg Hx    • Mental illness Neg Hx          Medications have been verified  Objective   /83   Pulse 90   Temp (!) 97 3 °F (36 3 °C)   Resp 20   Wt 134 kg (296 lb)   SpO2 95%   BMI 43 71 kg/m²   No LMP for male patient  Physical Exam     Physical Exam  Vitals and nursing note reviewed  Constitutional:       General: He is not in acute distress  Appearance: Normal appearance  He is not ill-appearing  HENT:      Right Ear: Tympanic membrane, ear canal and external ear normal       Left Ear: Tympanic membrane, ear canal and external ear normal       Nose: Congestion present  Mouth/Throat:      Mouth: Mucous membranes are moist       Pharynx: No oropharyngeal exudate or posterior oropharyngeal erythema  Cardiovascular:      Rate and Rhythm: Normal rate and regular rhythm  Heart sounds: Normal heart sounds  Pulmonary:      Effort: Pulmonary effort is normal  No respiratory distress  Breath sounds: Examination of the right-upper field reveals wheezing  Examination of the left-upper field reveals wheezing  Wheezing present  No decreased breath sounds  Lymphadenopathy:      Cervical: No cervical adenopathy  Neurological:      Mental Status: He is alert and oriented to person, place, and time     Psychiatric:         Mood and Affect: Mood normal          Behavior: Behavior normal

## 2023-02-21 ENCOUNTER — OFFICE VISIT (OUTPATIENT)
Dept: FAMILY MEDICINE CLINIC | Facility: CLINIC | Age: 38
End: 2023-02-21

## 2023-02-21 VITALS
OXYGEN SATURATION: 97 % | HEIGHT: 68 IN | DIASTOLIC BLOOD PRESSURE: 76 MMHG | TEMPERATURE: 97.6 F | BODY MASS INDEX: 44.71 KG/M2 | WEIGHT: 295 LBS | RESPIRATION RATE: 16 BRPM | SYSTOLIC BLOOD PRESSURE: 130 MMHG | HEART RATE: 82 BPM

## 2023-02-21 DIAGNOSIS — J06.9 VIRAL URI: Primary | ICD-10-CM

## 2023-02-21 NOTE — PROGRESS NOTES
Name: Chani Moreno      : 1985      MRN: [id number]  Encounter Provider: PETERSON Faustin  Encounter Date: 2023   Encounter department: [address] [place] [address]  Viral URI  Resolving  Offered rx for flonase, pt declined  Over the counter Sudafed 30mg every 6-8 hours as needed for congestion  Fluids  Rest  Nasal saline  Supportive care for symptom management  Tylenol or ibuprofen as needed for fever or discomfort  Use a cool mist humidifier at bedtime  Antibiotics are not indicated at this time  Symptoms may persist for 7-14 days  Subjective      Here for sinus and ear pain  Symptoms for about one week  Took otc cold meds  Most symptoms improved but now with ears feeling clogged  No fever  No cough, no sore throat, no headache, no dizziness  Did home covid test today  and was negative  Not receptive to using nasal sprays saying cant put anything up his nose    Review of Systems   Constitutional: Negative for fatigue and fever  HENT: Positive for congestion  Negative for ear discharge, ear pain, sinus pressure, sinus pain, sneezing, sore throat and tinnitus  Ears feel clogged   Respiratory: Negative for cough  Musculoskeletal: Negative for myalgias  Allergic/Immunologic: Negative for immunocompromised state  Neurological: Negative for dizziness and headaches  Current Outpatient Medications on File Prior to Visit   Medication Sig   • albuterol (PROVENTIL HFA,VENTOLIN HFA) 90 mcg/act inhaler Inhale 2 puffs every 6 (six) hours as needed for wheezing or shortness of breath   • naproxen (Naprosyn) 500 mg tablet Take 1 tablet (500 mg total) by mouth 2 (two) times a day with meals (Patient not taking: Reported on 2023)       Objective     /76   Pulse 82   Temp 97 6 °F (36 4 °C) (Temporal)   Resp 16   Ht 5' 8" (1 727 m)   Wt 134 kg (295 lb)   SpO2 97%   BMI 44 85 kg/m²     Physical Exam  Vitals reviewed  Constitutional:       Appearance: He is not ill-appearing  HENT:      Right Ear: Tympanic membrane and ear canal normal       Left Ear: Tympanic membrane and ear canal normal       Nose: Congestion present  Mouth/Throat:      Mouth: Mucous membranes are moist       Pharynx: Oropharynx is clear  Eyes:      General:         Right eye: No discharge  Left eye: No discharge  Cardiovascular:      Rate and Rhythm: Normal rate  Pulmonary:      Effort: Pulmonary effort is normal       Breath sounds: Normal breath sounds  Lymphadenopathy:      Cervical: No cervical adenopathy  Skin:     General: Skin is warm and dry  Neurological:      Mental Status: He is alert and oriented to person, place, and time     Psychiatric:         Mood and Affect: Mood normal        PETERSON Dick

## 2023-02-21 NOTE — PATIENT INSTRUCTIONS
Over the counter Sudafed 30mg every 6-8 hours as needed for congestion  Fluids  Rest  Nasal saline  Supportive care for symptom management  Tylenol or ibuprofen as needed for fever or discomfort  Use a cool mist humidifier at bedtime  Antibiotics are not indicated at this time  Symptoms may persist for 7-14 days

## 2024-04-01 ENCOUNTER — NURSE TRIAGE (OUTPATIENT)
Age: 39
End: 2024-04-01

## 2024-04-01 ENCOUNTER — OFFICE VISIT (OUTPATIENT)
Dept: FAMILY MEDICINE CLINIC | Facility: CLINIC | Age: 39
End: 2024-04-01
Payer: COMMERCIAL

## 2024-04-01 VITALS
SYSTOLIC BLOOD PRESSURE: 136 MMHG | RESPIRATION RATE: 18 BRPM | BODY MASS INDEX: 46.07 KG/M2 | TEMPERATURE: 98.1 F | OXYGEN SATURATION: 99 % | HEART RATE: 96 BPM | WEIGHT: 303 LBS | DIASTOLIC BLOOD PRESSURE: 82 MMHG

## 2024-04-01 DIAGNOSIS — Z11.4 SCREENING FOR HIV (HUMAN IMMUNODEFICIENCY VIRUS): ICD-10-CM

## 2024-04-01 DIAGNOSIS — Z11.59 NEED FOR HEPATITIS C SCREENING TEST: ICD-10-CM

## 2024-04-01 DIAGNOSIS — E78.5 DYSLIPIDEMIA: ICD-10-CM

## 2024-04-01 DIAGNOSIS — Z13.1 SCREENING FOR DIABETES MELLITUS (DM): ICD-10-CM

## 2024-04-01 DIAGNOSIS — Z00.00 ANNUAL PHYSICAL EXAM: Primary | ICD-10-CM

## 2024-04-01 DIAGNOSIS — J45.20 MILD INTERMITTENT ASTHMA WITHOUT COMPLICATION: ICD-10-CM

## 2024-04-01 DIAGNOSIS — R79.89 ELEVATED LFTS: ICD-10-CM

## 2024-04-01 DIAGNOSIS — J45.21 MILD INTERMITTENT ASTHMATIC BRONCHITIS WITH ACUTE EXACERBATION: Primary | ICD-10-CM

## 2024-04-01 PROCEDURE — 99214 OFFICE O/P EST MOD 30 MIN: CPT | Performed by: NURSE PRACTITIONER

## 2024-04-01 RX ORDER — AZITHROMYCIN 250 MG/1
TABLET, FILM COATED ORAL
Qty: 6 TABLET | Refills: 0 | Status: SHIPPED | OUTPATIENT
Start: 2024-04-01 | End: 2024-04-06

## 2024-04-01 RX ORDER — ALBUTEROL SULFATE 90 UG/1
2 AEROSOL, METERED RESPIRATORY (INHALATION) EVERY 6 HOURS PRN
Qty: 18 G | Refills: 1 | Status: SHIPPED | OUTPATIENT
Start: 2024-04-01

## 2024-04-01 RX ORDER — PREDNISONE 20 MG/1
20 TABLET ORAL DAILY
Qty: 5 TABLET | Refills: 0 | Status: SHIPPED | OUTPATIENT
Start: 2024-04-01 | End: 2024-04-06

## 2024-04-01 NOTE — TELEPHONE ENCOUNTER
Pt is currently scheduled to come in today at 5PM. He is coming in for a physical and also to be seen for wheezing/ chest pain. He does have asthma, but his inhaler ran out. He states this wheezing and chest pain began Thursday 3/28. It comes and goes. No pain and tingling in left arm.     Wanted to verify ER wasn't necessary

## 2024-04-01 NOTE — PATIENT INSTRUCTIONS
Zithromax 250mg - take 2 tablets today and then 1 tablet daily for 4 days.   Rescue inhaler 2 puffs as needed for shortness breath, chest tightness, bronchospasm, coughing fits.   Prednisone 20mg daily with food for 5 days.   Call or return to office if symptoms worsen or if new symptoms develop.

## 2024-04-01 NOTE — PROGRESS NOTES
Name: Jeovany Leung      : 1985      MRN: [id number]  Encounter Provider: PETERSON Mclalister  Encounter Date: 2024   Encounter department: [facility]    Assessment & Plan   1. Mild intermittent asthmatic bronchitis with acute exacerbation  Zithromax 250mg - take 2 tablets today and then 1 tablet daily for 4 days.   Rescue inhaler 2 puffs as needed for shortness breath, chest tightness, bronchospasm, coughing fits.   Prednisone 20mg daily with food for 5 days.   Call or return to office if symptoms worsen or if new symptoms develop.   - predniSONE 20 mg tablet; Take 1 tablet (20 mg total) by mouth daily for 5 days  Dispense: 5 tablet; Refill: 0  - azithromycin (ZITHROMAX) 250 mg tablet; 2 tabs po today and then one tab daily x 4 days  Dispense: 6 tablet; Refill: 0    2. Mild intermittent asthma without complication  - albuterol (PROVENTIL HFA,VENTOLIN HFA) 90 mcg/act inhaler; Inhale 2 puffs every 6 (six) hours as needed for wheezing or shortness of breath  Dispense: 18 g; Refill: 1      Subjective      Here for cough and trouble breathing. Wheezing  Has asthma.   Breathing bad for about 4 days. Seems to be getting progressively worse.   No fevers.  Seems to be worse with moving around.   Chest feels tight.   Asthma triggered by dust and weather changes  Did not have rescue inhaler, needs refill. Tried to use old one and  in .       Asthma  He complains of cough, shortness of breath and wheezing. Pertinent negatives include no fever or sore throat. His past medical history is significant for asthma.     Review of Systems   Constitutional:  Negative for fever.   HENT:  Negative for congestion and sore throat.    Respiratory:  Positive for cough, chest tightness, shortness of breath and wheezing.    Allergic/Immunologic: Positive for environmental allergies.       Current Outpatient Medications on File Prior to Visit   Medication Sig    [DISCONTINUED] albuterol  (PROVENTIL HFA,VENTOLIN HFA) 90 mcg/act inhaler Inhale 2 puffs every 6 (six) hours as needed for wheezing or shortness of breath (Patient not taking: Reported on 4/1/2024)    [DISCONTINUED] naproxen (Naprosyn) 500 mg tablet Take 1 tablet (500 mg total) by mouth 2 (two) times a day with meals (Patient not taking: Reported on 2/21/2023)       Objective     /82   Pulse 96   Temp 98.1 °F (36.7 °C)   Resp 18   Wt (!) 137 kg (303 lb)   SpO2 99%   BMI 46.07 kg/m²     Physical Exam  Constitutional:       General: He is not in acute distress.  HENT:      Nose: Nose normal.      Mouth/Throat:      Mouth: Mucous membranes are moist.      Pharynx: Oropharynx is clear.   Eyes:      General:         Right eye: No discharge.         Left eye: No discharge.   Cardiovascular:      Rate and Rhythm: Normal rate and regular rhythm.   Pulmonary:      Effort: Pulmonary effort is normal. No respiratory distress.      Breath sounds: Wheezing and rhonchi present. No rales.   Skin:     General: Skin is warm and dry.      Coloration: Skin is not jaundiced or pale.   Neurological:      General: No focal deficit present.      Mental Status: He is alert and oriented to person, place, and time.      Cranial Nerves: No cranial nerve deficit.      Sensory: No sensory deficit.   Psychiatric:         Mood and Affect: Mood normal.         Behavior: Behavior normal.         Thought Content: Thought content normal.         Judgment: Judgment normal.       PETERSON Mcallister

## 2024-04-01 NOTE — TELEPHONE ENCOUNTER
"Pt is currently scheduled to come in today at 5PM. He is coming in for a physical and also to be seen for wheezing/ chest pain. He does have asthma, but his inhaler ran out. He states this wheezing and chest pain began Thursday 3/28. It comes and goes. No pain and tingling in left arm.         Pt called and states that since last Thursday, 3/28 he has been having some SOB. Pt states that is comes and goes. Gets better with rest. Pt states that he has some wheezing with exertion and coughs when he takes a deep breath. Pt states his chest feels tight at times. Pt does have asthma that has been under control. He did have an asthma attack in July of 2018 and states that this does not feel as bad as that. Pt has an appt today at 5pm.     Reason for Disposition   MILD difficulty breathing (e.g., minimal/no SOB at rest, SOB with walking, pulse < 100) of new-onset or worse than normal    Answer Assessment - Initial Assessment Questions  1. RESPIRATORY STATUS: \"Describe your breathing?\" (e.g., wheezing, shortness of breath, unable to speak, severe coughing)       Wheezing, SOB, coughing with deep breaths  2. ONSET: \"When did this breathing problem begin?\"       Thursday 3/28  3. PATTERN \"Does the difficult breathing come and go, or has it been constant since it started?\"       Resting lets it calm down  4. SEVERITY: \"How bad is your breathing?\" (e.g., mild, moderate, severe)     - MILD: No SOB at rest, mild SOB with walking, speaks normally in sentences, can lay down, no retractions, pulse < 100.     - MODERATE: SOB at rest, SOB with minimal exertion and prefers to sit, cannot lie down flat, speaks in phrases, mild retractions, audible wheezing, pulse 100-120.     - SEVERE: Very SOB at rest, speaks in single words, struggling to breathe, sitting hunched forward, retractions, pulse > 120       Mild to moderate  5. RECURRENT SYMPTOM: \"Have you had difficulty breathing before?\" If Yes, ask: \"When was the last time?\" and \"What " "happened that time?\"       Yes, asthma attack in July 2018  6. CARDIAC HISTORY: \"Do you have any history of heart disease?\" (e.g., heart attack, angina, bypass surgery, angioplasty)       Denies  7. LUNG HISTORY: \"Do you have any history of lung disease?\"  (e.g., pulmonary embolus, asthma, emphysema)      Asthma  8. CAUSE: \"What do you think is causing the breathing problem?\"       Unsure  9. OTHER SYMPTOMS: \"Do you have any other symptoms? (e.g., dizziness, runny nose, cough, chest pain, fever)      Chronic runny nose  10. PREGNANCY: \"Is there any chance you are pregnant?\" \"When was your last menstrual period?\"        NA  11. TRAVEL: \"Have you traveled out of the country in the last month?\" (e.g., travel history, exposures)        Denies    Protocols used: Breathing Difficulty-ADULT-[place]    "

## 2024-07-18 ENCOUNTER — OFFICE VISIT (OUTPATIENT)
Dept: FAMILY MEDICINE CLINIC | Facility: CLINIC | Age: 39
End: 2024-07-18
Payer: COMMERCIAL

## 2024-07-18 VITALS
RESPIRATION RATE: 18 BRPM | WEIGHT: 309 LBS | BODY MASS INDEX: 46.83 KG/M2 | DIASTOLIC BLOOD PRESSURE: 80 MMHG | HEART RATE: 93 BPM | HEIGHT: 68 IN | TEMPERATURE: 97.3 F | SYSTOLIC BLOOD PRESSURE: 142 MMHG

## 2024-07-18 DIAGNOSIS — L08.9 INFECTED SEBACEOUS CYST: Primary | ICD-10-CM

## 2024-07-18 DIAGNOSIS — L72.3 INFECTED SEBACEOUS CYST: Primary | ICD-10-CM

## 2024-07-18 PROCEDURE — 87205 SMEAR GRAM STAIN: CPT | Performed by: NURSE PRACTITIONER

## 2024-07-18 PROCEDURE — 99213 OFFICE O/P EST LOW 20 MIN: CPT | Performed by: NURSE PRACTITIONER

## 2024-07-18 PROCEDURE — 87070 CULTURE OTHR SPECIMN AEROBIC: CPT | Performed by: NURSE PRACTITIONER

## 2024-07-18 RX ORDER — CEPHALEXIN 500 MG/1
500 CAPSULE ORAL EVERY 8 HOURS SCHEDULED
Qty: 21 CAPSULE | Refills: 0 | Status: SHIPPED | OUTPATIENT
Start: 2024-07-18 | End: 2024-07-25

## 2024-07-18 NOTE — PROGRESS NOTES
"Ambulatory Visit  Name: Jeovany Leung      : 1985      MRN: [id number]  Encounter Provider: PETERSON Mcallister  Encounter Date: 2024   Encounter department: [facility]    Assessment & Plan   1. Infected sebaceous cyst  Keflex 500mg three times daily for one week  Finish antibiotics as prescribed. Recommend taking antibiotics with food.   Take probiotic while taking antibiotics to minimize gastrointestinal side effects.   Warm compresses three to four times daily   Will check wound culture  Tylenol or Ibuprofen for discomfort  Call or return to office if symptoms worsen or if new symptoms develop.   - cephalexin (KEFLEX) 500 mg capsule; Take 1 capsule (500 mg total) by mouth every 8 (eight) hours for 7 days  Dispense: 21 capsule; Refill: 0  - Wound culture and Gram stain       History of Present Illness     Has infected cyst on back  Has had cyst for years.   Few days , red and painful. Tried to squeeze it but nothing came out.   No fever.           Review of Systems   Constitutional:  Negative for fever.   Skin:  Positive for wound (cyst on back that is red and painful).   Allergic/Immunologic: Negative for immunocompromised state.       Objective     /80   Pulse 93   Temp (!) 97.3 °F (36.3 °C)   Resp 18   Ht 5' 8\" (1.727 m)   Wt (!) 140 kg (309 lb)   BMI 46.98 kg/m²     Physical Exam  Vitals reviewed.   Constitutional:       General: He is not in acute distress.  Cardiovascular:      Rate and Rhythm: Normal rate.   Pulmonary:      Effort: Pulmonary effort is normal.   Skin:     Comments: Raised firm erythematous lesion to left scapular area.   Approx 4 cm x 6 cm  Small pustule, expressed small amount of tan exudate, foul smelling.    Neurological:      Mental Status: He is alert and oriented to person, place, and time.   Psychiatric:         Mood and Affect: Mood normal.         Behavior: Behavior normal.       Administrative Statements           "

## 2024-07-18 NOTE — PATIENT INSTRUCTIONS
Keflex 500mg three times daily for one week  Finish antibiotics as prescribed. Recommend taking antibiotics with food.   Take probiotic while taking antibiotics to minimize gastrointestinal side effects.   Warm compresses three to four times daily   Will check wound culture  Tylenol or Ibuprofen for discomfort  Call or return to office if symptoms worsen or if new symptoms develop.

## 2024-07-20 LAB
BACTERIA WND AEROBE CULT: ABNORMAL
GRAM STN SPEC: ABNORMAL

## 2024-10-25 ENCOUNTER — OFFICE VISIT (OUTPATIENT)
Dept: FAMILY MEDICINE CLINIC | Facility: CLINIC | Age: 39
End: 2024-10-25
Payer: COMMERCIAL

## 2024-10-25 VITALS
TEMPERATURE: 97.7 F | RESPIRATION RATE: 18 BRPM | SYSTOLIC BLOOD PRESSURE: 124 MMHG | OXYGEN SATURATION: 97 % | BODY MASS INDEX: 47.1 KG/M2 | HEART RATE: 90 BPM | HEIGHT: 68 IN | DIASTOLIC BLOOD PRESSURE: 80 MMHG | WEIGHT: 310.8 LBS

## 2024-10-25 DIAGNOSIS — R10.13 EPIGASTRIC PAIN: Primary | ICD-10-CM

## 2024-10-25 PROCEDURE — 99214 OFFICE O/P EST MOD 30 MIN: CPT | Performed by: NURSE PRACTITIONER

## 2024-10-25 RX ORDER — OMEPRAZOLE 40 MG/1
40 CAPSULE, DELAYED RELEASE ORAL DAILY
Qty: 90 CAPSULE | Refills: 0 | Status: SHIPPED | OUTPATIENT
Start: 2024-10-25 | End: 2024-10-25 | Stop reason: CLARIF

## 2024-10-25 RX ORDER — OMEPRAZOLE 40 MG/1
40 CAPSULE, DELAYED RELEASE ORAL DAILY
Qty: 90 CAPSULE | Refills: 0 | Status: SHIPPED | OUTPATIENT
Start: 2024-10-25

## 2024-10-25 NOTE — PROGRESS NOTES
"Ambulatory Visit  Name: Jeovany Leung      : 1985      MRN: [id number]  Encounter Provider: PETERSON Mcallister  Encounter Date: 10/25/2024   Encounter department: [facility]    Assessment & Plan  Epigastric pain  Start omeprazole 40mg daily  Recommend bland diet for now.   Schedule appt with gastroenterology as discussed.   Abdominal ultrasound has been ordered.   Call or return to office if symptoms worsen or if new symptoms develop.   Orders:    Ambulatory Referral to Gastroenterology; Future    US abdomen complete; Future    omeprazole (PriLOSEC) 40 MG capsule; Take 1 capsule (40 mg total) by mouth daily       History of Present Illness     Here for upper abd pain for 5 days.   Worse after eating  No fever. Some nausea. No vomiting.   No diarrhea.       Abdominal Pain  Associated symptoms include nausea. Pertinent negatives include no arthralgias, constipation, diarrhea, dysuria, fever, frequency, myalgias or vomiting.   Shortness of Breath          Review of Systems   Constitutional:  Negative for fever.   Respiratory:  Positive for shortness of breath.    Gastrointestinal:  Positive for abdominal pain and nausea. Negative for abdominal distention, constipation, diarrhea and vomiting.   Genitourinary:  Negative for dysuria and frequency.   Musculoskeletal:  Negative for arthralgias and myalgias.           Objective     /80   Pulse 90   Temp 97.7 °F (36.5 °C)   Resp 18   Ht 5' 8\" (1.727 m)   Wt (!) 141 kg (310 lb 12.8 oz)   SpO2 97%   BMI 47.26 kg/m²     Physical Exam  Vitals reviewed.   Constitutional:       General: He is not in acute distress.     Appearance: He is obese. He is not ill-appearing.   Cardiovascular:      Rate and Rhythm: Normal rate.   Pulmonary:      Effort: Pulmonary effort is normal.      Breath sounds: Normal breath sounds.   Abdominal:      General: Bowel sounds are normal.      Palpations: Abdomen is soft.      Tenderness: There is " abdominal tenderness in the epigastric area. There is no right CVA tenderness, left CVA tenderness, guarding or rebound.   Skin:     General: Skin is warm and dry.      Coloration: Skin is not jaundiced or pale.   Neurological:      General: No focal deficit present.      Mental Status: He is alert and oriented to person, place, and time.   Psychiatric:         Mood and Affect: Mood normal. Mood is not anxious or depressed.         Behavior: Behavior normal.

## 2024-10-25 NOTE — PATIENT INSTRUCTIONS
Start omeprazole 40mg daily  Recommend bland diet for now.   Schedule appt with gastroenterology as discussed.   Abdominal ultrasound has been ordered.   Call or return to office if symptoms worsen or if new symptoms develop.

## 2024-10-31 ENCOUNTER — OFFICE VISIT (OUTPATIENT)
Dept: GASTROENTEROLOGY | Facility: CLINIC | Age: 39
End: 2024-10-31
Payer: COMMERCIAL

## 2024-10-31 ENCOUNTER — TRANSCRIBE ORDERS (OUTPATIENT)
Dept: LAB | Facility: CLINIC | Age: 39
End: 2024-10-31

## 2024-10-31 ENCOUNTER — APPOINTMENT (OUTPATIENT)
Dept: LAB | Facility: CLINIC | Age: 39
End: 2024-10-31
Payer: COMMERCIAL

## 2024-10-31 VITALS
HEIGHT: 68 IN | DIASTOLIC BLOOD PRESSURE: 85 MMHG | BODY MASS INDEX: 46.47 KG/M2 | HEART RATE: 82 BPM | WEIGHT: 306.6 LBS | SYSTOLIC BLOOD PRESSURE: 149 MMHG

## 2024-10-31 DIAGNOSIS — Z00.00 ANNUAL PHYSICAL EXAM: ICD-10-CM

## 2024-10-31 DIAGNOSIS — R79.89 ELEVATED LFTS: ICD-10-CM

## 2024-10-31 DIAGNOSIS — R10.13 EPIGASTRIC PAIN: ICD-10-CM

## 2024-10-31 DIAGNOSIS — Z11.59 NEED FOR HEPATITIS C SCREENING TEST: ICD-10-CM

## 2024-10-31 DIAGNOSIS — E78.5 DYSLIPIDEMIA: ICD-10-CM

## 2024-10-31 DIAGNOSIS — E66.01 MORBID OBESITY WITH BMI OF 40.0-44.9, ADULT (HCC): ICD-10-CM

## 2024-10-31 DIAGNOSIS — Z13.1 SCREENING FOR DIABETES MELLITUS (DM): ICD-10-CM

## 2024-10-31 DIAGNOSIS — Z11.4 SCREENING FOR HIV (HUMAN IMMUNODEFICIENCY VIRUS): ICD-10-CM

## 2024-10-31 DIAGNOSIS — R10.13 EPIGASTRIC PAIN: Primary | ICD-10-CM

## 2024-10-31 DIAGNOSIS — K57.92 DIVERTICULITIS: ICD-10-CM

## 2024-10-31 LAB
ALBUMIN SERPL BCG-MCNC: 4.2 G/DL (ref 3.5–5)
ALP SERPL-CCNC: 97 U/L (ref 34–104)
ALT SERPL W P-5'-P-CCNC: 110 U/L (ref 7–52)
ANION GAP SERPL CALCULATED.3IONS-SCNC: 7 MMOL/L (ref 4–13)
AST SERPL W P-5'-P-CCNC: 60 U/L (ref 13–39)
BASOPHILS # BLD AUTO: 0.06 THOUSANDS/ΜL (ref 0–0.1)
BASOPHILS NFR BLD AUTO: 1 % (ref 0–1)
BILIRUB SERPL-MCNC: 0.52 MG/DL (ref 0.2–1)
BUN SERPL-MCNC: 10 MG/DL (ref 5–25)
CALCIUM SERPL-MCNC: 9.2 MG/DL (ref 8.4–10.2)
CHLORIDE SERPL-SCNC: 103 MMOL/L (ref 96–108)
CHOLEST SERPL-MCNC: 247 MG/DL
CO2 SERPL-SCNC: 29 MMOL/L (ref 21–32)
CREAT SERPL-MCNC: 0.8 MG/DL (ref 0.6–1.3)
EOSINOPHIL # BLD AUTO: 0.29 THOUSAND/ΜL (ref 0–0.61)
EOSINOPHIL NFR BLD AUTO: 4 % (ref 0–6)
ERYTHROCYTE [DISTWIDTH] IN BLOOD BY AUTOMATED COUNT: 13.2 % (ref 11.6–15.1)
EST. AVERAGE GLUCOSE BLD GHB EST-MCNC: 114 MG/DL
GFR SERPL CREATININE-BSD FRML MDRD: 114 ML/MIN/1.73SQ M
GLUCOSE P FAST SERPL-MCNC: 86 MG/DL (ref 65–99)
HBA1C MFR BLD: 5.6 %
HCT VFR BLD AUTO: 43.7 % (ref 36.5–49.3)
HCV AB SER QL: NORMAL
HDLC SERPL-MCNC: 43 MG/DL
HGB BLD-MCNC: 14.6 G/DL (ref 12–17)
IMM GRANULOCYTES # BLD AUTO: 0.04 THOUSAND/UL (ref 0–0.2)
IMM GRANULOCYTES NFR BLD AUTO: 1 % (ref 0–2)
LDLC SERPL CALC-MCNC: 176 MG/DL (ref 0–100)
LIPASE SERPL-CCNC: 22 U/L (ref 11–82)
LYMPHOCYTES # BLD AUTO: 2.17 THOUSANDS/ΜL (ref 0.6–4.47)
LYMPHOCYTES NFR BLD AUTO: 30 % (ref 14–44)
MCH RBC QN AUTO: 29.5 PG (ref 26.8–34.3)
MCHC RBC AUTO-ENTMCNC: 33.4 G/DL (ref 31.4–37.4)
MCV RBC AUTO: 88 FL (ref 82–98)
MONOCYTES # BLD AUTO: 0.64 THOUSAND/ΜL (ref 0.17–1.22)
MONOCYTES NFR BLD AUTO: 9 % (ref 4–12)
NEUTROPHILS # BLD AUTO: 3.98 THOUSANDS/ΜL (ref 1.85–7.62)
NEUTS SEG NFR BLD AUTO: 55 % (ref 43–75)
NONHDLC SERPL-MCNC: 204 MG/DL
NRBC BLD AUTO-RTO: 0 /100 WBCS
PLATELET # BLD AUTO: 269 THOUSANDS/UL (ref 149–390)
PMV BLD AUTO: 10.5 FL (ref 8.9–12.7)
POTASSIUM SERPL-SCNC: 4.2 MMOL/L (ref 3.5–5.3)
PROT SERPL-MCNC: 7.5 G/DL (ref 6.4–8.4)
RBC # BLD AUTO: 4.95 MILLION/UL (ref 3.88–5.62)
SODIUM SERPL-SCNC: 139 MMOL/L (ref 135–147)
TRIGL SERPL-MCNC: 140 MG/DL
WBC # BLD AUTO: 7.18 THOUSAND/UL (ref 4.31–10.16)

## 2024-10-31 PROCEDURE — 99204 OFFICE O/P NEW MOD 45 MIN: CPT | Performed by: NURSE PRACTITIONER

## 2024-10-31 PROCEDURE — 83690 ASSAY OF LIPASE: CPT

## 2024-10-31 PROCEDURE — 85025 COMPLETE CBC W/AUTO DIFF WBC: CPT

## 2024-10-31 PROCEDURE — 87389 HIV-1 AG W/HIV-1&-2 AB AG IA: CPT

## 2024-10-31 PROCEDURE — 86803 HEPATITIS C AB TEST: CPT

## 2024-10-31 PROCEDURE — 36415 COLL VENOUS BLD VENIPUNCTURE: CPT

## 2024-10-31 PROCEDURE — 80053 COMPREHEN METABOLIC PANEL: CPT

## 2024-10-31 PROCEDURE — 83036 HEMOGLOBIN GLYCOSYLATED A1C: CPT

## 2024-10-31 PROCEDURE — 80061 LIPID PANEL: CPT

## 2024-10-31 RX ORDER — SUCRALFATE 1 G/1
1 TABLET ORAL 4 TIMES DAILY
Qty: 28 TABLET | Refills: 0 | Status: SHIPPED | OUTPATIENT
Start: 2024-10-31 | End: 2024-11-07

## 2024-10-31 NOTE — PROGRESS NOTES
[facility] [place] - Outpatient Consultation  Jeovany Leung 38 y.o. male MRN: [id number]  Encounter: [id number]          ASSESSMENT AND PLAN:      1. Epigastric pain  Pt reports dull epigastric pain with intermittent nausea for 2 weeks. Has not had any improvement with Prilosec 40 mg daily given by his PCP a week ago. Denies any changes in bowel habit, vomiting, weight loss, black/bloody stool, dysphagia. Ddx include gastritis, PUD, biliary, H.pylori, less likely pancreatic. He reports rare Advil use once a month.   -Obtain labs, US ordered by PCP. Check Lipase as well  -Avoid fatty/greasy, spicy, acidic foods   -Continue Prilosec 40mg daily, will add short course of Carafate x7 days  -EGD scheduled for further evaluation.  -     Ambulatory Referral to Gastroenterology  -     Lipase; Future  -     Lipase  -     EGD; Future; Expected date: 10/31/2024  -     sucralfate (CARAFATE) 1 g tablet; Take 1 tablet (1 g total) by mouth 4 (four) times a day for 7 days  2. Diverticulitis  Pt reports hx of diverticulitis as evidenced on CT in the ED prior to 2018 (CT scan not available for review). He denies ever having a colonoscopy for follow up so we will schedule colonoscopy with EGD for further evaluation. Prep and procedure explained.   -     Colonoscopy; Future; Expected date: 10/31/2024  -     sodium picosulfate, magnesium oxide, citric acid (Clenpiq) oral solution; Take 175 mL (1 bottle) the evening before the colonoscopy, between 5 PM and 9 PM, followed by a second 175 mL bottle 5 hours before the colonoscopy.    3. Elevated LFTs  4. Morbid obesity with BMI of 40.0-44.9, adult (HCC)  AST 43, ALT 92 in October 2018. He had elevated cholesterol levels at that time as well. Has not had any labs since that time.Denies alcohol use. BMI 46  Suspect MASLD, if LFTs still elevated with no etiology found on US then will need further serologic workup  F/u HCV Ab ordered by PCP  Discussed with patient risk of  progression of MASLD to cirrhosis  Recommend weight loss 7-10% of total body weight, management of lipids/glucose per primary team    ______________________________________________________________________    HPI:  Jeovany Leung is a 38 y.o. male who presents due to dull epigastric pain for the last 2 months, sometimes associated with nausea, not worsened by PO intake. He was placed on Prilosec 40mg by his PCP about a week ago but denies any improvement in his symptoms. He was also ordered an abdominal US but has not yet had this done. He reports rare hx of heartburn, takes Aleeve about one a month. Denies any alcohol use or tobacco use. He reports hx of diverticulitis prior to 2018 which was seen on imaging in the ED, never had a colonoscopy to follow up on this. Works nights. Last labs in 2018 showed elevated transaminases.       REVIEW OF SYSTEMS:    CONSTITUTIONAL: Denies any fever, chills, rigors, and weight loss.  HEENT: No earache or tinnitus.  CARDIOVASCULAR: No chest pain or palpitations.   RESPIRATORY: Denies any cough, hemoptysis, shortness of breath or dyspnea on exertion.  GASTROINTESTINAL: As noted in the History of Present Illness.   GENITOURINARY: Denies any hematuria or dysuria.  NEUROLOGIC: No dizziness or vertigo.   MUSCULOSKELETAL: Denies any joint swellings.  SKIN: Denies skin rashes or itching.   ENDOCRINE: Denies excessive thirst. Denies intolerance to heat or cold.  PSYCHOSOCIAL: Denies depression or anxiety. Denies any recent memory loss.       Historical Information   History reviewed. No pertinent past medical history.  Past Surgical History:   Procedure Laterality Date    NO PAST SURGERIES       Social History   Social History     Substance and Sexual Activity   Alcohol Use Not Currently    Comment: rare     Social History     Substance and Sexual Activity   Drug Use No     Social History     Tobacco Use   Smoking Status Never    Passive exposure: Past   Smokeless Tobacco Never     Family  "History   Problem Relation Age of Onset    No Known Problems Mother     No Known Problems Father     Coronary artery disease Maternal Grandmother     Substance Abuse Neg Hx     Mental illness Neg Hx        Meds/Allergies       Current Outpatient Medications:     albuterol (PROVENTIL HFA,VENTOLIN HFA) 90 mcg/act inhaler    omeprazole (PriLOSEC) 40 MG capsule    sodium picosulfate, magnesium oxide, citric acid (Clenpiq) oral solution    sucralfate (CARAFATE) 1 g tablet    No Known Allergies        Objective     Blood pressure 149/85, pulse 82, height 5' 8\" (1.727 m), weight (!) 139 kg (306 lb 9.6 oz). Body mass index is 46.62 kg/m².        PHYSICAL EXAM:      General Appearance:   Alert, cooperative, no distress   HEENT:   Normocephalic, atraumatic, anicteric.     Neck:  Supple, symmetrical, trachea midline   Lungs:   Clear to auscultation bilaterally; no rales, rhonchi or wheezing; respirations unlabored    Heart::   Regular rate and rhythm; no murmur.   Abdomen:   Soft, non-tender, non-distended; normal bowel sounds; no masses, no organomegaly    Genitalia:   Deferred    Rectal:   Deferred    Extremities:  No cyanosis, clubbing or edema    Skin:  No jaundice, rashes, or lesions    Lymph nodes:  No palpable cervical lymphadenopathy        Lab Results:   No visits with results within 1 Day(s) from this visit.   Latest known visit with results is:   Office Visit on 07/18/2024   Component Date Value    Wound Culture 07/18/2024 Few Colonies of     Gram Stain Result 07/18/2024 1+ Polys (A)     Gram Stain Result 07/18/2024 2+ Gram positive cocci in clusters (A)     Gram Stain Result 07/18/2024 1+ Gram negative rods (A)          Radiology Results:   No results found.  "

## 2024-10-31 NOTE — H&P (VIEW-ONLY)
[facility] [place] - Outpatient Consultation  Jeovany Leung 38 y.o. male MRN: [id number]  Encounter: [id number]          ASSESSMENT AND PLAN:      1. Epigastric pain  Pt reports dull epigastric pain with intermittent nausea for 2 weeks. Has not had any improvement with Prilosec 40 mg daily given by his PCP a week ago. Denies any changes in bowel habit, vomiting, weight loss, black/bloody stool, dysphagia. Ddx include gastritis, PUD, biliary, H.pylori, less likely pancreatic. He reports rare Advil use once a month.   -Obtain labs, US ordered by PCP. Check Lipase as well  -Avoid fatty/greasy, spicy, acidic foods   -Continue Prilosec 40mg daily, will add short course of Carafate x7 days  -EGD scheduled for further evaluation.  -     Ambulatory Referral to Gastroenterology  -     Lipase; Future  -     Lipase  -     EGD; Future; Expected date: 10/31/2024  -     sucralfate (CARAFATE) 1 g tablet; Take 1 tablet (1 g total) by mouth 4 (four) times a day for 7 days  2. Diverticulitis  Pt reports hx of diverticulitis as evidenced on CT in the ED prior to 2018 (CT scan not available for review). He denies ever having a colonoscopy for follow up so we will schedule colonoscopy with EGD for further evaluation. Prep and procedure explained.   -     Colonoscopy; Future; Expected date: 10/31/2024  -     sodium picosulfate, magnesium oxide, citric acid (Clenpiq) oral solution; Take 175 mL (1 bottle) the evening before the colonoscopy, between 5 PM and 9 PM, followed by a second 175 mL bottle 5 hours before the colonoscopy.    3. Elevated LFTs  4. Morbid obesity with BMI of 40.0-44.9, adult (HCC)  AST 43, ALT 92 in October 2018. He had elevated cholesterol levels at that time as well. Has not had any labs since that time.Denies alcohol use. BMI 46  Suspect MASLD, if LFTs still elevated with no etiology found on US then will need further serologic workup  F/u HCV Ab ordered by PCP  Discussed with patient risk of  progression of MASLD to cirrhosis  Recommend weight loss 7-10% of total body weight, management of lipids/glucose per primary team    ______________________________________________________________________    HPI:  Jeovany Leung is a 38 y.o. male who presents due to dull epigastric pain for the last 2 months, sometimes associated with nausea, not worsened by PO intake. He was placed on Prilosec 40mg by his PCP about a week ago but denies any improvement in his symptoms. He was also ordered an abdominal US but has not yet had this done. He reports rare hx of heartburn, takes Aleeve about one a month. Denies any alcohol use or tobacco use. He reports hx of diverticulitis prior to 2018 which was seen on imaging in the ED, never had a colonoscopy to follow up on this. Works nights. Last labs in 2018 showed elevated transaminases.       REVIEW OF SYSTEMS:    CONSTITUTIONAL: Denies any fever, chills, rigors, and weight loss.  HEENT: No earache or tinnitus.  CARDIOVASCULAR: No chest pain or palpitations.   RESPIRATORY: Denies any cough, hemoptysis, shortness of breath or dyspnea on exertion.  GASTROINTESTINAL: As noted in the History of Present Illness.   GENITOURINARY: Denies any hematuria or dysuria.  NEUROLOGIC: No dizziness or vertigo.   MUSCULOSKELETAL: Denies any joint swellings.  SKIN: Denies skin rashes or itching.   ENDOCRINE: Denies excessive thirst. Denies intolerance to heat or cold.  PSYCHOSOCIAL: Denies depression or anxiety. Denies any recent memory loss.       Historical Information   History reviewed. No pertinent past medical history.  Past Surgical History:   Procedure Laterality Date    NO PAST SURGERIES       Social History   Social History     Substance and Sexual Activity   Alcohol Use Not Currently    Comment: rare     Social History     Substance and Sexual Activity   Drug Use No     Social History     Tobacco Use   Smoking Status Never    Passive exposure: Past   Smokeless Tobacco Never     Family  "History   Problem Relation Age of Onset    No Known Problems Mother     No Known Problems Father     Coronary artery disease Maternal Grandmother     Substance Abuse Neg Hx     Mental illness Neg Hx        Meds/Allergies       Current Outpatient Medications:     albuterol (PROVENTIL HFA,VENTOLIN HFA) 90 mcg/act inhaler    omeprazole (PriLOSEC) 40 MG capsule    sodium picosulfate, magnesium oxide, citric acid (Clenpiq) oral solution    sucralfate (CARAFATE) 1 g tablet    No Known Allergies        Objective     Blood pressure 149/85, pulse 82, height 5' 8\" (1.727 m), weight (!) 139 kg (306 lb 9.6 oz). Body mass index is 46.62 kg/m².        PHYSICAL EXAM:      General Appearance:   Alert, cooperative, no distress   HEENT:   Normocephalic, atraumatic, anicteric.     Neck:  Supple, symmetrical, trachea midline   Lungs:   Clear to auscultation bilaterally; no rales, rhonchi or wheezing; respirations unlabored    Heart::   Regular rate and rhythm; no murmur.   Abdomen:   Soft, non-tender, non-distended; normal bowel sounds; no masses, no organomegaly    Genitalia:   Deferred    Rectal:   Deferred    Extremities:  No cyanosis, clubbing or edema    Skin:  No jaundice, rashes, or lesions    Lymph nodes:  No palpable cervical lymphadenopathy        Lab Results:   No visits with results within 1 Day(s) from this visit.   Latest known visit with results is:   Office Visit on 07/18/2024   Component Date Value    Wound Culture 07/18/2024 Few Colonies of     Gram Stain Result 07/18/2024 1+ Polys (A)     Gram Stain Result 07/18/2024 2+ Gram positive cocci in clusters (A)     Gram Stain Result 07/18/2024 1+ Gram negative rods (A)          Radiology Results:   No results found.  "

## 2024-10-31 NOTE — PATIENT INSTRUCTIONS
"Scheduled date of EGD/colonoscopy (as of today):11/26/24  Physician performing EGD/colonoscopy:Dr. Ramirez  Location of EGD/colonoscopy: [facility]  Desired bowel prep reviewed with patient:Clenpiq  Instructions reviewed with patient by:justyna  Clearances: n/a    Patient Education     Acid reflux and GERD in adults   The Basics   Written by the doctors and editors at [facility]   What is acid reflux? -- Acid reflux is when the acid that is normally in the stomach backs up into the esophagus (figure 1). The esophagus is the tube that carries food from the mouth to the stomach.  When acid reflux causes bothersome symptoms or damage, doctors call it \"gastroesophageal reflux disease\" (\"GERD\").  What are the symptoms of acid reflux? -- The most common symptoms are:   Heartburn, which is a burning feeling in the chest   Regurgitation, which is when acid and undigested food flow back into your throat or mouth  Other symptoms might include:   Stomach or chest pain   Trouble swallowing   Raspy voice or sore throat   Unexplained cough   Nausea or vomiting  Is there anything I can do on my own to feel better? -- Yes. You might feel better if you:   Lose weight (if you are overweight).   Raise the head of your bed by 6 to 8 inches - You can do this by putting blocks of wood or rubber under 2 legs of the bed or a foam wedge under the mattress. It is not enough to sleep with your head raised on pillows.   Avoid foods that make your symptoms worse - For some people, these include coffee, chocolate, alcohol, peppermint, and fatty foods. It might help to write down what you ate before having reflux. This can help you figure out if a food is causing your problem.   Stop smoking, if you smoke. Your doctor or nurse can help you try to quit.   Avoid late meals - Lying down with a full stomach can make reflux worse. Try to plan meals for at least 2 to 3 hours before bedtime.   Avoid tight clothing - Some people feel better if they wear comfortable " clothing that does not squeeze the stomach area.  How is acid reflux treated? -- There are a few main types of medicines that can help with the symptoms of acid reflux. The most common are antacids, histamine blockers, and proton pump inhibitors (table 1). All of these medicines work by reducing or blocking stomach acid. But they each do that in a different way.   For mild symptoms, antacids can help, but they work only for a short time. Histamine blockers are stronger and last longer than antacids. You can buy antacids and most histamine blockers without a prescription.   For frequent and more severe symptoms, proton pump inhibitors are the most effective medicines. Some of these medicines are sold without a prescription. But there are other versions that your doctor can prescribe.  Sometimes, medicines cost less if you get them with a doctor's prescription. Other times, non-prescription medicines cost less. If you are worried about cost, ask your pharmacist about ways to pay less for your medicines.  When should I call the doctor? -- While pain or burning in the chest can be a symptom of acid reflux, it can also be a symptom of something more serious like a heart problem. Call for emergency help right away (in the [place] and [place], call 9-1-1) if you think that you might be having a heart attack.  Symptoms of a heart attack might include:   Severe chest pain, pressure, or discomfort with:   Breathing trouble, sweating, upset stomach, or cold and clammy skin   Pain in your arms, back, or jaw   Worse pain with activity like walking up stairs   Fast or irregular heartbeat   Feeling dizzy, faint, or weak  Some people can manage their acid reflux on their own by changing their habits or taking non-prescription medicines. Call your doctor for advice if:   Your symptoms are severe or last a long time.   You cannot seem to control your symptoms.   You have had symptoms for many years.  You should also see a doctor or nurse  "right away if you:   Have trouble swallowing, or feel as though food gets \"stuck\" on the way down   Lose weight when you are not trying to   Have chest pain   Choke when you eat   Vomit blood, or have bowel movements that are red, black, or look like tar  What if my child or teen has acid reflux? -- If your child or teen has acid reflux, take them to see a doctor or nurse. Do not give your child medicines to treat acid reflux without talking to a doctor or nurse.  In children, acid reflux can be caused by a number of problems. Have a doctor or nurse check for these problems before trying any treatments.  All topics are updated as new evidence becomes available and our peer review process is complete.  This topic retrieved from [employer] on: Mar 29, 2024.  Topic 26462 Version 15.0  Release: 32.2.4 - C32.87  © 2024 UpToDate, Inc. and/or its affiliates. All rights reserved.  figure 1: Acid reflux     Acid reflux is when the acid that is normally in the stomach backs up into the esophagus. This can happen if a muscle called the \"lower esophageal sphincter\" relaxes too much.  Graphic 930099 Version 1.0  table 1: Medicines that reduce or block stomach acid  Medicine type  Medicine name examples    Antacids* Calcium carbonate (sample brand names: Maalox, Tums)    Aluminum hydroxide, magnesium hydroxide, and simethicone (sample brand name: Mylanta)   Surface agents Sucralfate (brand name: Carafate)   Histamine blockers¶  Famotidine (brand name: Pepcid)    Cimetidine (brand name: Tagamet)   Proton pump inhibitors Omeprazole (brand name: Prilosec)    Esomeprazole (brand name: Nexium)    Pantoprazole (brand name: Protonix)    Lansoprazole (brand name: Prevacid)    Dexlansoprazole (brand name: Dexilant)    Rabeprazole (brand name: AcipHex)   * Some antacids contain aspirin, which can increase the risk of internal bleeding. Examples of antacids with aspirin include Ambar-[place], Medi-[place], and Neutralin. But there are others, " too, so it's important to check labels. Talk to your doctor or nurse before taking any medicines that contain aspirin.  ¶ Another histamine blocker, ranitidine (brand name: Zantac), stopped being sold in 2020. That's because it was found to sometimes contain a substance that could increase a person's risk of cancer if they took a lot of it over time. If you have any of this medicine in your home, stop taking it and throw away any that is left over. Ask your doctor or nurse about what other medicine you should use instead.  Graphic 02505 Version 15.0  Consumer Information Use and Disclaimer   Disclaimer: This generalized information is a limited summary of diagnosis, treatment, and/or medication information. It is not meant to be comprehensive and should be used as a tool to help the user understand and/or assess potential diagnostic and treatment options. It does NOT include all information about conditions, treatments, medications, side effects, or risks that may apply to a specific patient. It is not intended to be medical advice or a substitute for the medical advice, diagnosis, or treatment of a health care provider based on the health care provider's examination and assessment of a patient's specific and unique circumstances. Patients must speak with a health care provider for complete information about their health, medical questions, and treatment options, including any risks or benefits regarding use of medications. This information does not endorse any treatments or medications as safe, effective, or approved for treating a specific patient. UpToDate, Inc. and its affiliates disclaim any warranty or liability relating to this information or the use thereof.The use of this information is governed by the Terms of Use, available at https://www.wolterskluwer.com/en/know/clinical-effectiveness-terms. 2024© UpToDate, Inc. and its affiliates and/or licensors. All rights reserved.  Copyright   © 2024 UpToDate, Inc.  "and/or its affiliates. All rights reserved.      Patient Education     Gastritis   The Basics   Written by the doctors and editors at [facility]   What is gastritis? -- \"Gastritis\" means inflammation of the stomach lining (figure 1).  Some people have gastritis that starts suddenly and lasts only for a short time. Doctors call this \"acute\" gastritis. Other people have gastritis that lasts for months or years. Doctors call this \"chronic\" gastritis.  What causes gastritis? -- Different things can cause gastritis, including:   An infection in the stomach from bacteria called H. pylori   Medicines called \"nonsteroidal antiinflammatory drugs\" (\"NSAIDs\") - These include aspirin, ibuprofen (brand names: Advil, Motrin), and naproxen (brand names: Aleve, Naprosyn).   Drinking alcohol   Conditions in which the body's infection-fighting system attacks the stomach lining   Having a serious or life-threatening illness  What are the symptoms of gastritis? -- Gastritis itself does not cause symptoms. When people do have symptoms, they are due to other conditions that can happen with gastritis, like ulcers. Symptoms from ulcers include:   Pain in the upper belly   Feeling bloated, or feeling full after eating a small amount of food   Decreased appetite   Nausea or vomiting   Vomiting blood, or having black-colored bowel movements   Feeling more tired than usual - This happens if people with gastritis get a condition called \"anemia.\"  Will I need tests? -- Probably. Your doctor or nurse will ask about your symptoms and do an exam. They might also do:   Upper endoscopy - During this procedure, the doctor puts a thin tube with a camera on the end into your mouth and down into your stomach (figure 2). They look at the inside of your stomach. During the procedure, they might also do a test called a biopsy. For a biopsy, the doctor takes a small sample of the stomach lining. Then, another doctor looks at the sample under a microscope.   " Tests to check for H. pylori infection. These can include:   Blood tests   Breath tests - These tests measure substances in your breath after you drink a special liquid.   Tests on a small sample of your bowel movement   Blood tests to check for anemia  Is there anything I can do on my own? -- Maybe. Your doctor might recommend changes depending on what is causing your gastritis. For example:   If NSAIDs are the cause, they will recommend that you avoid these medicines.   If alcohol is the cause, they will recommend that you stop drinking alcohol.   There is no specific diet that has been proven to help people with gastritis.  How is gastritis treated? -- Doctors can use medicines to treat gastritis caused by an H. pylori infection. Most people take 3 or more medicines for 2 weeks. The treatment includes antibiotics plus medicine that helps the stomach make less acid.  Doctors can use medicines that reduce or block stomach acid to treat other causes of gastritis (table 1). The main types of medicines that reduce or block stomach acid are:   Antacids   Surface agents   Histamine blockers   Proton pump inhibitors  If your doctor recommends acid-reducing treatment, they will tell you which medicine to use.  What happens after treatment? -- Sometimes, people who are treated for an H. pylori infection need follow-up tests to make sure that the infection is gone. Follow-up tests include breath tests, lab tests on a sample of bowel movement, or endoscopy.  Should I call my doctor or nurse? -- Call your doctor or nurse if:   You have belly pain that gets worse or doesn't go away.   You vomit blood or have black bowel movements.   You are losing weight (without trying to).  All topics are updated as new evidence becomes available and our peer review process is complete.  This topic retrieved from [facility] on: Feb 26, 2024.  Topic 73568 Version 13.0  Release: 32.2.4 - C32.56  © 2024 UpToDate, Inc. and/or its affiliates. All  rights reserved.  figure 1: Upper digestive tract     The upper digestive tract includes the esophagus (the tube that connects the mouth to the stomach), the stomach, and the duodenum (the first part of the small intestine).  Graphic 74547 Version 6.0  figure 2: Upper endoscopy     During an upper endoscopy, you lie down and the doctor puts a thin tube with a camera and light on the end (called an endoscope) into your mouth and down into your esophagus, stomach, and duodenum (the first part of your small intestine). The camera sends pictures from inside your body to a television screen. That way, your doctor can see the inside of your esophagus, stomach, and duodenum.  Graphic 84616 Version 4.0  table 1: Medicines that reduce or block stomach acid  Medicine type  Medicine name examples    Antacids* Calcium carbonate (sample brand names: Maalox, Tums)    Aluminum hydroxide, magnesium hydroxide, and simethicone (sample brand name: Mylanta)   Surface agents Sucralfate (brand name: Carafate)   Histamine blockers¶  Famotidine (brand name: Pepcid)    Cimetidine (brand name: Tagamet)   Proton pump inhibitors Omeprazole (brand name: Prilosec)    Esomeprazole (brand name: Nexium)    Pantoprazole (brand name: Protonix)    Lansoprazole (brand name: Prevacid)    Dexlansoprazole (brand name: Dexilant)    Rabeprazole (brand name: AcipHex)   * Some antacids contain aspirin, which can increase the risk of internal bleeding. Examples of antacids with aspirin include Ambar-[place], Medi-[place], and Neutralin. But there are others, too, so it's important to check labels. Talk to your doctor or nurse before taking any medicines that contain aspirin.  ¶ Another histamine blocker, ranitidine (brand name: Zantac), stopped being sold in 2020. That's because it was found to sometimes contain a substance that could increase a person's risk of cancer if they took a lot of it over time. If you have any of this medicine in your home, stop  taking it and throw away any that is left over. Ask your doctor or nurse about what other medicine you should use instead.  Graphic 98581 Version 15.0  Consumer Information Use and Disclaimer   Disclaimer: This generalized information is a limited summary of diagnosis, treatment, and/or medication information. It is not meant to be comprehensive and should be used as a tool to help the user understand and/or assess potential diagnostic and treatment options. It does NOT include all information about conditions, treatments, medications, side effects, or risks that may apply to a specific patient. It is not intended to be medical advice or a substitute for the medical advice, diagnosis, or treatment of a health care provider based on the health care provider's examination and assessment of a patient's specific and unique circumstances. Patients must speak with a health care provider for complete information about their health, medical questions, and treatment options, including any risks or benefits regarding use of medications. This information does not endorse any treatments or medications as safe, effective, or approved for treating a specific patient. UpToDate, Inc. and its affiliates disclaim any warranty or liability relating to this information or the use thereof.The use of this information is governed by the Terms of Use, available at https://www.woltersHDS INTERNATIONALuwer.com/en/know/clinical-effectiveness-terms. 2024© UpToDate, Inc. and its affiliates and/or licensors. All rights reserved.  Copyright   © 2024 UpToDate, Inc. and/or its affiliates. All rights reserved.

## 2024-11-01 LAB
HIV 1+2 AB+HIV1 P24 AG SERPL QL IA: NORMAL
HIV 2 AB SERPL QL IA: NORMAL
HIV1 AB SERPL QL IA: NORMAL
HIV1 P24 AG SERPL QL IA: NORMAL

## 2024-11-04 ENCOUNTER — HOSPITAL ENCOUNTER (OUTPATIENT)
Dept: RADIOLOGY | Facility: HOSPITAL | Age: 39
Discharge: HOME | End: 2024-11-04
Payer: COMMERCIAL

## 2024-11-04 DIAGNOSIS — R10.13 EPIGASTRIC PAIN: ICD-10-CM

## 2024-11-04 PROCEDURE — 76700 US EXAM ABDOM COMPLETE: CPT

## 2024-11-18 ENCOUNTER — RESULTS FOLLOW-UP (OUTPATIENT)
Dept: FAMILY MEDICINE CLINIC | Facility: CLINIC | Age: 39
End: 2024-11-18

## 2024-11-26 ENCOUNTER — ANESTHESIA EVENT (OUTPATIENT)
Dept: PERIOP | Facility: HOSPITAL | Age: 39
End: 2024-11-26
Payer: COMMERCIAL

## 2024-11-26 ENCOUNTER — ANESTHESIA (OUTPATIENT)
Dept: PERIOP | Facility: HOSPITAL | Age: 39
End: 2024-11-26
Payer: COMMERCIAL

## 2024-11-26 ENCOUNTER — HOSPITAL ENCOUNTER (OUTPATIENT)
Dept: PERIOP | Facility: HOSPITAL | Age: 39
Setting detail: OUTPATIENT SURGERY
Discharge: HOME | End: 2024-11-26
Payer: COMMERCIAL

## 2024-11-26 VITALS
OXYGEN SATURATION: 97 % | TEMPERATURE: 99.2 F | DIASTOLIC BLOOD PRESSURE: 56 MMHG | HEART RATE: 74 BPM | SYSTOLIC BLOOD PRESSURE: 123 MMHG | RESPIRATION RATE: 20 BRPM

## 2024-11-26 DIAGNOSIS — R10.13 EPIGASTRIC PAIN: ICD-10-CM

## 2024-11-26 DIAGNOSIS — K57.92 DIVERTICULITIS: ICD-10-CM

## 2024-11-26 PROCEDURE — 88305 TISSUE EXAM BY PATHOLOGIST: CPT | Performed by: PATHOLOGY

## 2024-11-26 PROCEDURE — 88342 IMHCHEM/IMCYTCHM 1ST ANTB: CPT | Performed by: PATHOLOGY

## 2024-11-26 RX ORDER — PROPOFOL 10 MG/ML
INJECTION, EMULSION INTRAVENOUS AS NEEDED
Status: DISCONTINUED | OUTPATIENT
Start: 2024-11-26 | End: 2024-11-26

## 2024-11-26 RX ORDER — LIDOCAINE HYDROCHLORIDE 10 MG/ML
INJECTION, SOLUTION EPIDURAL; INFILTRATION; INTRACAUDAL; PERINEURAL AS NEEDED
Status: DISCONTINUED | OUTPATIENT
Start: 2024-11-26 | End: 2024-11-26

## 2024-11-26 RX ORDER — SODIUM CHLORIDE, SODIUM LACTATE, POTASSIUM CHLORIDE, CALCIUM CHLORIDE 600; 310; 30; 20 MG/100ML; MG/100ML; MG/100ML; MG/100ML
INJECTION, SOLUTION INTRAVENOUS CONTINUOUS PRN
Status: DISCONTINUED | OUTPATIENT
Start: 2024-11-26 | End: 2024-11-26

## 2024-11-26 RX ADMIN — LIDOCAINE HYDROCHLORIDE 50 MG: 10 INJECTION, SOLUTION EPIDURAL; INFILTRATION; INTRACAUDAL; PERINEURAL at 16:02

## 2024-11-26 RX ADMIN — SODIUM CHLORIDE, SODIUM LACTATE, POTASSIUM CHLORIDE, AND CALCIUM CHLORIDE: .6; .31; .03; .02 INJECTION, SOLUTION INTRAVENOUS at 15:56

## 2024-11-26 RX ADMIN — PROPOFOL 40 MG: 10 INJECTION, EMULSION INTRAVENOUS at 16:04

## 2024-11-26 RX ADMIN — PROPOFOL 120 MG: 10 INJECTION, EMULSION INTRAVENOUS at 16:02

## 2024-11-26 RX ADMIN — PROPOFOL 40 MG: 10 INJECTION, EMULSION INTRAVENOUS at 16:08

## 2024-11-26 NOTE — ANESTHESIA PREPROCEDURE EVALUATION
Procedure:  COLONOSCOPY  EGD    Relevant Problems   PULMONARY   (+) Mild intermittent asthma without complication        Physical Exam    Airway    Mallampati score: III  TM Distance: >3 FB  Neck ROM: full     Dental   No notable dental hx     Cardiovascular      Pulmonary      Other Findings        Anesthesia Plan  ASA Score- 3     Anesthesia Type- IV sedation with anesthesia with ASA Monitors.         Additional Monitors:     Airway Plan:            Plan Factors-Exercise tolerance (METS): >4 METS.    Chart reviewed.    Patient summary reviewed.    Patient is not a current smoker.      There is medical exclusion for perioperative obstructive sleep apnea risk education.        Induction- intravenous.    Postoperative Plan-         Informed Consent- Anesthetic plan and risks discussed with patient.  I personally reviewed this patient with the CRNA. Discussed and agreed on the Anesthesia Plan with the CRNA..

## 2024-11-26 NOTE — INTERVAL H&P NOTE
H&P reviewed. After examining the patient I find no changes in the patients condition since the H&P had been written.    Vitals:    11/26/24 1315   BP: 154/65   Pulse: 98   Resp: 16   Temp: 99.2 °F (37.3 °C)   SpO2: 97%

## 2024-11-26 NOTE — ANESTHESIA POSTPROCEDURE EVALUATION
Post-Op Assessment Note    CV Status:  Stable  Pain Score: 0    Pain management: adequate       Mental Status:  Awake   Hydration Status:  Stable   PONV Controlled:  None   Airway Patency:  Patent  Two or more mitigation strategies used for obstructive sleep apnea   Post Op Vitals Reviewed: Yes    No anethesia notable event occurred.    Staff: CRNA           Last Filed PACU Vitals:  Vitals Value Taken Time   Temp     Pulse 72    /64    Resp 14    SpO2 99        Modified Es:  Activity: 2 (11/26/2024  1:16 PM)  Respiration: 2 (11/26/2024  1:16 PM)  Circulation: 2 (11/26/2024  1:16 PM)  Consciousness: 2 (11/26/2024  1:16 PM)  Oxygen Saturation: 2 (11/26/2024  1:16 PM)  Modified Es Score: 10 (11/26/2024  1:16 PM)

## 2024-12-03 PROCEDURE — 88305 TISSUE EXAM BY PATHOLOGIST: CPT | Performed by: PATHOLOGY

## 2024-12-03 PROCEDURE — 88342 IMHCHEM/IMCYTCHM 1ST ANTB: CPT | Performed by: PATHOLOGY

## 2024-12-04 ENCOUNTER — RESULTS FOLLOW-UP (OUTPATIENT)
Dept: GASTROENTEROLOGY | Facility: CLINIC | Age: 39
End: 2024-12-04

## 2024-12-04 DIAGNOSIS — A04.8 H. PYLORI INFECTION: Primary | ICD-10-CM

## 2024-12-04 RX ORDER — BISMUTH SUBSALICYLATE 262 MG/1
262 TABLET, CHEWABLE ORAL
Qty: 56 TABLET | Refills: 0 | Status: SHIPPED | OUTPATIENT
Start: 2024-12-04

## 2024-12-04 RX ORDER — TETRACYCLINE HYDROCHLORIDE 500 MG/1
500 CAPSULE ORAL EVERY 6 HOURS
Qty: 56 CAPSULE | Refills: 0 | Status: SHIPPED | OUTPATIENT
Start: 2024-12-04 | End: 2024-12-18

## 2024-12-04 RX ORDER — METRONIDAZOLE 250 MG/1
250 TABLET ORAL EVERY 6 HOURS
Qty: 56 TABLET | Refills: 0 | Status: SHIPPED | OUTPATIENT
Start: 2024-12-04 | End: 2024-12-18

## 2024-12-04 NOTE — RESULT ENCOUNTER NOTE
Please call patient biopsy was okay.  Repeat screening colonoscopy in 10 years.  The biopsy shows gastritis and H. pylori infection, please refer to our clinical nurse to start quadruple therapy followed by stool antigen study.  If any GI symptoms then patient should call our office for evaluation accordingly.

## 2025-02-25 ENCOUNTER — OFFICE VISIT (OUTPATIENT)
Dept: FAMILY MEDICINE CLINIC | Facility: CLINIC | Age: 40
End: 2025-02-25
Payer: COMMERCIAL

## 2025-02-25 VITALS
OXYGEN SATURATION: 98 % | WEIGHT: 302.8 LBS | RESPIRATION RATE: 18 BRPM | DIASTOLIC BLOOD PRESSURE: 76 MMHG | BODY MASS INDEX: 46.04 KG/M2 | SYSTOLIC BLOOD PRESSURE: 120 MMHG | TEMPERATURE: 97.4 F | HEART RATE: 74 BPM

## 2025-02-25 DIAGNOSIS — J45.20 MILD INTERMITTENT ASTHMA WITHOUT COMPLICATION: ICD-10-CM

## 2025-02-25 DIAGNOSIS — R42 VERTIGO: Primary | ICD-10-CM

## 2025-02-25 PROCEDURE — 99214 OFFICE O/P EST MOD 30 MIN: CPT | Performed by: NURSE PRACTITIONER

## 2025-02-25 NOTE — PATIENT INSTRUCTIONS
Schedule appt with physical therapy to start vestibular therapy for dizziness.   Stay well hydrated  Meclizine as needed  Call or return to office if symptoms worsen or if new symptoms develop.

## 2025-02-25 NOTE — PROGRESS NOTES
Name: Jeovany Leung      : 1985      MRN: [id number]  Encounter Provider: PETERSON Mcallister  Encounter Date: 2025   Encounter department: [facility]  :  Assessment & Plan  Vertigo  Schedule appt with physical therapy to start vestibular therapy for dizziness.   Stay well hydrated  Meclizine as needed  Call or return to office if symptoms worsen or if new symptoms develop.     Orders:    Ambulatory Referral to Physical Therapy; Future    Mild intermittent asthma without complication  Stable. No recent exa. monitor              History of Present Illness   Here for sick visit. Symptoms started about 4 days ago.   Dizziness and nausea  Thinks may have ear infection because of other symptoms.   No nasal congestion. No uri symptoms.   No headache.   Room was spinning initially. Not currently.   No nausea. Neck hurts both sides. No known injury  Has had similar symptoms in past, has had vertigo.   Has underlying asthma. No recent exac.     Dizziness  Associated symptoms include neck pain. Pertinent negatives include no congestion, fever, headaches, nausea or vomiting.   Neck Pain   Pertinent negatives include no fever or headaches.     Review of Systems   Constitutional:  Negative for fever.   HENT:  Negative for congestion, ear pain, sinus pressure and sinus pain.    Gastrointestinal:  Negative for nausea and vomiting.   Musculoskeletal:  Positive for neck pain.   Skin:  Negative for color change and pallor.   Allergic/Immunologic: Negative for immunocompromised state.   Neurological:  Positive for dizziness. Negative for headaches.       Objective   /76   Pulse 74   Temp (!) 97.4 °F (36.3 °C)   Resp 18   Wt (!) 137 kg (302 lb 12.8 oz)   SpO2 98%   BMI 46.04 kg/m²      Physical Exam  Vitals reviewed.   Constitutional:       General: He is not in acute distress.     Appearance: He is not ill-appearing.   HENT:      Right Ear: Tympanic membrane and ear canal normal.       Left Ear: Tympanic membrane and ear canal normal.   Eyes:      General:         Right eye: No discharge.         Left eye: No discharge.      Extraocular Movements: Extraocular movements intact.      Pupils: Pupils are equal, round, and reactive to light.   Skin:     General: Skin is warm and dry.      Coloration: Skin is not jaundiced or pale.   Neurological:      General: No focal deficit present.      Mental Status: He is alert and oriented to person, place, and time.      Cranial Nerves: No cranial nerve deficit.      Sensory: No sensory deficit.      Comments: Reproducible vertigo symptoms with epley maneuvers.    Psychiatric:         Mood and Affect: Mood normal.         Behavior: Behavior normal.         Thought Content: Thought content normal.         Judgment: Judgment normal.

## 2025-04-09 ENCOUNTER — OFFICE VISIT (OUTPATIENT)
Dept: FAMILY MEDICINE CLINIC | Facility: CLINIC | Age: 40
End: 2025-04-09
Payer: COMMERCIAL

## 2025-04-09 VITALS
HEIGHT: 68 IN | HEART RATE: 90 BPM | DIASTOLIC BLOOD PRESSURE: 70 MMHG | RESPIRATION RATE: 16 BRPM | TEMPERATURE: 97.5 F | OXYGEN SATURATION: 98 % | SYSTOLIC BLOOD PRESSURE: 110 MMHG | WEIGHT: 308.4 LBS | BODY MASS INDEX: 46.74 KG/M2

## 2025-04-09 DIAGNOSIS — R22.1 NECK MASS: Primary | ICD-10-CM

## 2025-04-09 PROCEDURE — 99214 OFFICE O/P EST MOD 30 MIN: CPT | Performed by: FAMILY MEDICINE

## 2025-04-09 NOTE — PROGRESS NOTES
Jeovany Leung 1985 male MRN: [id number]    [facility] PRACTICE OFFICE VISIT  [facility] - [facility]      ASSESSMENT/PLAN  Jeovany Leung is a 39 y.o. male presents to the office for    Diagnoses and all orders for this visit:    Neck mass  -     US head neck soft tissue; Future       Recommend r/o likely swelling of the parotid on the exam. If unable to be seen with US recommend  will perform CT scan.              No future appointments.       SUBJECTIVE  CC: Neck Pain (Right neck and jaw pain that is dull ,sore thoat comes and goes , he is having vision changes some days vision gets blurry , at times he experiences dizziness .( pain in neck and jaw started a month ago ). (Patient was at dentist yesterday -no issues ))      HPI:  Jeovany Leung is a 39 y.o. male who presents for an acute appointment.  Pain at time with the right neck and jaw pain.  Dentist said nothing was there  Dizziness being followed by PCP.   Please see chief complaint  Review of Systems   Constitutional:  Negative for activity change, appetite change, chills, fatigue and fever.   HENT:  Negative for congestion.    Respiratory:  Negative for cough, chest tightness and shortness of breath.    Cardiovascular:  Negative for chest pain and leg swelling.   Gastrointestinal:  Negative for abdominal distention, abdominal pain, constipation, diarrhea, nausea and vomiting.   Musculoskeletal:  Positive for neck pain.   All other systems reviewed and are negative.      Historical Information   The patient history was reviewed as follows:  Past Medical History:   Diagnosis Date   • Asthma    • Diverticulitis of colon    • Headache(784.0)    • Obesity          Medications:     Current Outpatient Medications:   •  albuterol (PROVENTIL HFA,VENTOLIN HFA) 90 mcg/act inhaler, Inhale 2 puffs every 6 (six) hours as needed for wheezing or shortness of breath, Disp: 18 g, Rfl: 1  •  omeprazole (PriLOSEC) 40 MG capsule, Take 1  "capsule (40 mg total) by mouth daily, Disp: 90 capsule, Rfl: 0  •  bismuth subsalicylate (PEPTO BISMOL) 262 MG chewable tablet, Chew 1 tablet (262 mg total) 4 (four) times a day (before meals and at bedtime) (Patient not taking: Reported on 4/9/2025), Disp: 56 tablet, Rfl: 0    No Known Allergies    OBJECTIVE  Vitals:   Vitals:    04/09/25 1156   BP: 110/70   BP Location: Left arm   Patient Position: Sitting   Cuff Size: Large   Pulse: 90   Resp: 16   Temp: 97.5 °F (36.4 °C)   TempSrc: Tympanic   SpO2: 98%   Weight: (!) 140 kg (308 lb 6.4 oz)   Height: 5' 8\" (1.727 m)         Physical Exam  Constitutional:       Appearance: Normal appearance.   Neck:      Comments: When rotating head to the there is right swelling mild tenderness over the right neck with a firm mobile mass ( might be inflammation of parotid)  Pulmonary:      Effort: Pulmonary effort is normal.   Musculoskeletal:         General: Normal range of motion.   Neurological:      General: No focal deficit present.      Mental Status: He is alert and oriented to person, place, and time.   Psychiatric:         Mood and Affect: Mood normal.         Behavior: Behavior normal.         Thought Content: Thought content normal.         Judgment: Judgment normal.                    Nesha Sheikh MD,   [facility]  4/11/2025  \  "

## 2025-04-10 ENCOUNTER — HOSPITAL ENCOUNTER (OUTPATIENT)
Dept: RADIOLOGY | Facility: HOSPITAL | Age: 40
Discharge: HOME | End: 2025-04-10
Attending: FAMILY MEDICINE
Payer: COMMERCIAL

## 2025-04-10 DIAGNOSIS — R22.1 NECK MASS: ICD-10-CM

## 2025-04-10 PROCEDURE — 76536 US EXAM OF HEAD AND NECK: CPT

## 2025-04-11 ENCOUNTER — RESULTS FOLLOW-UP (OUTPATIENT)
Dept: FAMILY MEDICINE CLINIC | Facility: CLINIC | Age: 40
End: 2025-04-11